# Patient Record
Sex: FEMALE | Race: WHITE | NOT HISPANIC OR LATINO | Employment: UNEMPLOYED | URBAN - METROPOLITAN AREA
[De-identification: names, ages, dates, MRNs, and addresses within clinical notes are randomized per-mention and may not be internally consistent; named-entity substitution may affect disease eponyms.]

---

## 2017-08-16 ENCOUNTER — OFFICE VISIT (OUTPATIENT)
Dept: URGENT CARE | Age: 22
End: 2017-08-16
Payer: COMMERCIAL

## 2017-08-16 PROCEDURE — S9088 SERVICES PROVIDED IN URGENT: HCPCS | Performed by: FAMILY MEDICINE

## 2017-08-16 PROCEDURE — 99213 OFFICE O/P EST LOW 20 MIN: CPT | Performed by: FAMILY MEDICINE

## 2018-02-10 ENCOUNTER — OFFICE VISIT (OUTPATIENT)
Dept: URGENT CARE | Age: 23
End: 2018-02-10
Payer: COMMERCIAL

## 2018-02-10 VITALS
DIASTOLIC BLOOD PRESSURE: 78 MMHG | HEIGHT: 64 IN | HEART RATE: 78 BPM | OXYGEN SATURATION: 98 % | BODY MASS INDEX: 30.22 KG/M2 | TEMPERATURE: 98 F | RESPIRATION RATE: 20 BRPM | WEIGHT: 177 LBS | SYSTOLIC BLOOD PRESSURE: 122 MMHG

## 2018-02-10 DIAGNOSIS — R68.89 FLU-LIKE SYMPTOMS: Primary | ICD-10-CM

## 2018-02-10 PROCEDURE — S9088 SERVICES PROVIDED IN URGENT: HCPCS | Performed by: FAMILY MEDICINE

## 2018-02-10 PROCEDURE — 99213 OFFICE O/P EST LOW 20 MIN: CPT | Performed by: FAMILY MEDICINE

## 2018-02-10 RX ORDER — BENZONATATE 100 MG/1
100 CAPSULE ORAL 3 TIMES DAILY PRN
Qty: 20 CAPSULE | Refills: 0 | Status: SHIPPED | OUTPATIENT
Start: 2018-02-10 | End: 2018-10-03

## 2018-02-10 NOTE — PATIENT INSTRUCTIONS
This appears to be viral illness, this may be influenza  Rest and drink extra fluids  Start cough medication as prescribed  OTC cold medications as needed  You can also try Mucinex, Flonase  Cool mist humidification as needed  Follow up with PCP if no improvement  Go to ER with worsening symptoms, difficulty breathing  Influenza   WHAT YOU NEED TO KNOW:   Influenza (the flu) is an infection caused by the influenza virus  The flu is easily spread when an infected person coughs, sneezes, or has close contact with others  You may be able to spread the flu to others for 1 week or longer after signs or symptoms appear  DISCHARGE INSTRUCTIONS:   Call 911 for any of the following:   · You have trouble breathing, and your lips look purple or blue  · You have a seizure  Return to the emergency department if:   · You are dizzy, or you are urinating less or not at all  · You have a headache with a stiff neck, and you feel tired or confused  · You have new pain or pressure in your chest     · Your symptoms, such as shortness of breath, vomiting, or diarrhea, get worse  · Your symptoms, such as fever and coughing, seem to get better, but then get worse  Contact your healthcare provider if:   · You have new muscle pain or weakness  · You have questions or concerns about your condition or care  Medicines: You may need any of the following:  · Acetaminophen  decreases pain and fever  It is available without a doctor's order  Ask how much to take and how often to take it  Follow directions  Acetaminophen can cause liver damage if not taken correctly  · NSAIDs , such as ibuprofen, help decrease swelling, pain, and fever  This medicine is available with or without a doctor's order  NSAIDs can cause stomach bleeding or kidney problems in certain people  If you take blood thinner medicine, always ask your healthcare provider if NSAIDs are safe for you   Always read the medicine label and follow directions  · Antivirals  help fight a viral infection  · Take your medicine as directed  Contact your healthcare provider if you think your medicine is not helping or if you have side effects  Tell him or her if you are allergic to any medicine  Keep a list of the medicines, vitamins, and herbs you take  Include the amounts, and when and why you take them  Bring the list or the pill bottles to follow-up visits  Carry your medicine list with you in case of an emergency  Rest  as much as you can to help you recover  Drink liquids as directed  to help prevent dehydration  Ask how much liquid to drink each day and which liquids are best for you  Prevent the spread of influenza:   · Wash your hands often  Use soap and water  Wash your hands after you use the bathroom, change a child's diapers, or sneeze  Wash your hands before you prepare or eat food  Use gel hand cleanser when soap and water are not available  Do not touch your eyes, nose, or mouth unless you have washed your hands first            · Cover your mouth when you sneeze or cough  Cough into a tissue or the bend of your arm  · Clean shared items with a germ-killing   Clean table surfaces, doorknobs, and light switches  Do not share towels, silverware, and dishes with people who are sick  Wash bed sheets, towels, silverware, and dishes with soap and water  · Wear a mask  over your mouth and nose if you are sick or are near anyone who is sick  · Stay away from others  if you are sick  · Influenza vaccine  helps prevent influenza (flu)  Everyone older than 6 months should get a yearly influenza vaccine  Get the vaccine as soon as it is available, usually in September or October each year  Follow up with your healthcare provider as directed:  Write down your questions so you remember to ask them during your visits     © 2017 [employer] Information is for End User's use only and may not be sold, redistributed or otherwise used for commercial purposes  All illustrations and images included in CareNotes® are the copyrighted property of A D [employer] M [employer]  or [employer]  The above information is an  only  It is not intended as medical advice for individual conditions or treatments  Talk to your doctor, nurse or pharmacist before following any medical regimen to see if it is safe and effective for you

## 2018-02-10 NOTE — PROGRESS NOTES
[facility]        NAME: Froy Isaac is a 23 y o  female  : 1995    MRN: [id number]  DATE: February 10, 2018  TIME: 12:58 PM    Assessment and Plan   Flu-like symptoms [R68 89]  1  Flu-like symptoms  benzonatate (TESSALON PERLES) 100 mg capsule         Patient Instructions     Patient Instructions     This appears to be viral illness, this may be influenza  Rest and drink extra fluids  Start cough medication as prescribed  OTC cold medications as needed  You can also try Mucinex, Flonase  Cool mist humidification as needed  Follow up with PCP if no improvement  Go to ER with worsening symptoms, difficulty breathing  Chief Complaint     Chief Complaint   Patient presents with    Headache     symptoms started on tuesday night  Denies any fever or chills  Had one loose stool with some nausea yesterday   Cough     productive cough- feels "scratchy"         History of Present Illness   Froy Isaac presents to the clinic c/o    This is a 23year old female here today with chest congestion, cough and headache  She states symptoms started about 4 days ago  She had bodyaches and headache at beginning of illness  She stats now cough, congestion and headache continue  No fever at this time  But she has had chills  She has been using sudafed and nyquil  Review of Systems   Review of Systems   Constitutional: Positive for fatigue  Negative for activity change and fever  HENT: Positive for congestion, sinus pain and sore throat  Negative for ear pain  Respiratory: Positive for cough  Negative for chest tightness and wheezing  Cardiovascular: Negative for chest pain  Gastrointestinal: Negative for diarrhea, nausea and vomiting  Musculoskeletal: Negative for arthralgias, joint swelling, neck pain and neck stiffness  Skin: Negative for rash  Neurological: Positive for headaches           Current Medications     Long-Term Prescriptions   Medication Sig Dispense Refill    Norethin-Eth Estrad-Fe Biphas (LO LOESTRIN FE) 1 MG-10 MCG / 10 MCG TABS Take by mouth         Current Allergies     Allergies as of 02/10/2018    (No Known Allergies)            The following portions of the patient's history were reviewed and updated as appropriate: allergies, current medications, past family history, past medical history, past social history, past surgical history and problem list     Objective   /78 (BP Location: Right arm, Patient Position: Sitting, Cuff Size: Standard)   Pulse 78   Temp 98 °F (36 7 °C)   Resp 20   Ht 5' 4" (1 626 m)   Wt 80 3 kg (177 lb)   LMP  (LMP Unknown)   SpO2 98%   BMI 30 38 kg/m²        Physical Exam     Physical Exam   Constitutional: She is oriented to person, place, and time  She appears well-developed and well-nourished  HENT:   Head: Normocephalic  Neck: Normal range of motion  Neck supple  Cardiovascular: Normal rate and regular rhythm  Pulmonary/Chest: Effort normal and breath sounds normal  No respiratory distress  She has no wheezes  She has no rales  She exhibits no tenderness  Neurological: She is alert and oriented to person, place, and time  Skin: Skin is warm and dry  Psychiatric: She has a normal mood and affect

## 2018-02-10 NOTE — LETTER
February 10, 2018     Patient: Brynn Bob   YOB: 1995   Date of Visit: 2/10/2018       To Whom It May Concern: It is my medical opinion that Britni Krishnamurthy may return to work on 02/12/2018  If you have any questions or concerns, please don't hesitate to call           Sincerely,        JULIAN Odell    CC: No Recipients

## 2018-10-03 ENCOUNTER — OFFICE VISIT (OUTPATIENT)
Dept: FAMILY MEDICINE CLINIC | Facility: CLINIC | Age: 23
End: 2018-10-03
Payer: COMMERCIAL

## 2018-10-03 VITALS
OXYGEN SATURATION: 98 % | SYSTOLIC BLOOD PRESSURE: 120 MMHG | DIASTOLIC BLOOD PRESSURE: 72 MMHG | HEIGHT: 64 IN | TEMPERATURE: 98.6 F | HEART RATE: 58 BPM | BODY MASS INDEX: 30.87 KG/M2 | WEIGHT: 180.8 LBS

## 2018-10-03 DIAGNOSIS — E66.9 OBESITY (BMI 30-39.9): ICD-10-CM

## 2018-10-03 DIAGNOSIS — G56.01 CARPAL TUNNEL SYNDROME OF RIGHT WRIST: Primary | ICD-10-CM

## 2018-10-03 PROCEDURE — 99214 OFFICE O/P EST MOD 30 MIN: CPT | Performed by: INTERNAL MEDICINE

## 2018-10-03 RX ORDER — GABAPENTIN 100 MG/1
100 CAPSULE ORAL
Qty: 30 CAPSULE | Refills: 0 | Status: SHIPPED | OUTPATIENT
Start: 2018-10-03 | End: 2018-11-05 | Stop reason: SDUPTHER

## 2018-10-03 RX ORDER — VITAMIN B COMPLEX
1 CAPSULE ORAL DAILY
Qty: 90 CAPSULE | Refills: 1 | Status: SHIPPED | OUTPATIENT
Start: 2018-10-03 | End: 2019-01-09 | Stop reason: SDUPTHER

## 2018-10-03 NOTE — PROGRESS NOTES
Assessment/Plan:         Diagnoses and all orders for this visit:    Carpal tunnel syndrome of right wrist : most likely  Bilt  But worse on Right side :  -     EMG 2 Limb Upper Extremity; Future  -     Basic metabolic panel; Future  -     CBC; Future  -     Lipid panel; Future  -     TSH, 3rd generation; Future  -     T4, free; Future  -     Thyroid Antibodies Panel; Future  -     Hepatic function panel; Future  -     Magnesium; Future  -     Vitamin B12; Future  -     Sedimentation rate, automated; Future  -     Vitamin D 25 hydroxy; Future  -     Urinalysis with reflex to microscopic  Wrist/Cucup splints On nighttime off day time  Try :  -     b complex vitamins capsule; Take 1 capsule by mouth daily  -     gabapentin (NEURONTIN) 100 mg capsule; Take 1 capsule (100 mg total) by mouth daily at bedtime With food  RTC in 1mo    Obesity (BMI 30-39 9)  -     Life Style mod  RTC in 3mos  -     Basic metabolic panel; Future  -     CBC; Future  -     Lipid panel; Future  -     TSH, 3rd generation; Future  -     T4, free; Future  -     Thyroid Antibodies Panel; Future  -     Hepatic function panel; Future  -     Magnesium; Future  -     Vitamin B12; Future  -     Sedimentation rate, automated; Future  -     Vitamin D 25 hydroxy; Future  -     Urinalysis with reflex to microscopic  -     b complex vitamins capsule; Take 1 capsule by mouth daily        Subjective:      Patient ID: Frankie Simms is a 20 y o  female  20 Y O lady is here for regular check up and increasing Neuropathy both hands rt is worse,  For few mos    No recent blood work,  Med List Reviewed w pt in Detail    No trauma,  Hand Pain    Associated symptoms include numbness  Pertinent negatives include no chest pain         The following portions of the patient's history were reviewed and updated as appropriate: allergies, current medications, past family history, past medical history, past social history, past surgical history and problem list     Review of Systems   Constitutional: Negative for chills, fatigue and fever  HENT: Negative for congestion, facial swelling, sore throat, trouble swallowing and voice change  Eyes: Negative for pain, discharge and visual disturbance  Respiratory: Negative for cough, shortness of breath and wheezing  Cardiovascular: Negative for chest pain, palpitations and leg swelling  Gastrointestinal: Negative for abdominal pain, blood in stool, constipation, diarrhea and nausea  Endocrine: Negative for polydipsia, polyphagia and polyuria  Genitourinary: Negative for difficulty urinating, hematuria and urgency  Musculoskeletal: Negative for arthralgias and myalgias  Skin: Negative for rash  Neurological: Positive for numbness  Negative for dizziness, tremors, weakness and headaches  Hematological: Negative for adenopathy  Does not bruise/bleed easily  Psychiatric/Behavioral: Negative for dysphoric mood, sleep disturbance and suicidal ideas  Objective:      /72 (BP Location: Left arm, Patient Position: Sitting, Cuff Size: Standard)   Pulse 58   Temp 98 6 °F (37 °C) (Oral)   Ht 5' 4" (1 626 m)   Wt 82 kg (180 lb 12 8 oz)   SpO2 98%   Breastfeeding? No   BMI 31 03 kg/m²          Physical Exam   Constitutional: She is oriented to person, place, and time  She appears well-nourished  No distress  HENT:   Head: Normocephalic  Mouth/Throat: Oropharynx is clear and moist  No oropharyngeal exudate  Eyes: Pupils are equal, round, and reactive to light  Conjunctivae are normal  No scleral icterus  Neck: Neck supple  No thyromegaly present  Cardiovascular: Normal rate, regular rhythm and normal heart sounds  No murmur heard  Pulmonary/Chest: Effort normal and breath sounds normal  No respiratory distress  She has no wheezes  She has no rales  Abdominal: Soft  Bowel sounds are normal  She exhibits no distension  There is no tenderness  There is no rebound and no guarding  Musculoskeletal: She exhibits no edema or tenderness  Lymphadenopathy:     She has no cervical adenopathy  Neurological: She is alert and oriented to person, place, and time  No cranial nerve deficit  Coordination normal    Neuropathy over the Median nerve both wrists more on Right side,    Skin: No rash noted  No erythema  No pallor  Psychiatric: She has a normal mood and affect

## 2018-10-18 ENCOUNTER — TRANSCRIBE ORDERS (OUTPATIENT)
Dept: LAB | Age: 23
End: 2018-10-18

## 2018-10-18 DIAGNOSIS — R94.5 NONSPECIFIC ABNORMAL RESULTS OF LIVER FUNCTION STUDY: ICD-10-CM

## 2018-10-18 DIAGNOSIS — E78.5 HYPERLIPIDEMIA, UNSPECIFIED HYPERLIPIDEMIA TYPE: Primary | ICD-10-CM

## 2018-10-25 ENCOUNTER — APPOINTMENT (OUTPATIENT)
Dept: LAB | Facility: CLINIC | Age: 23
End: 2018-10-25
Payer: COMMERCIAL

## 2018-10-25 DIAGNOSIS — E78.5 HYPERLIPIDEMIA, UNSPECIFIED HYPERLIPIDEMIA TYPE: ICD-10-CM

## 2018-10-25 DIAGNOSIS — R94.5 NONSPECIFIC ABNORMAL RESULTS OF LIVER FUNCTION STUDY: ICD-10-CM

## 2018-10-25 LAB
ALBUMIN SERPL BCP-MCNC: 3.7 G/DL (ref 3.5–5)
ALP SERPL-CCNC: 79 U/L (ref 46–116)
ALT SERPL W P-5'-P-CCNC: 23 U/L (ref 12–78)
ANION GAP SERPL CALCULATED.3IONS-SCNC: 5 MMOL/L (ref 4–13)
AST SERPL W P-5'-P-CCNC: 18 U/L (ref 5–45)
BILIRUB DIRECT SERPL-MCNC: 0.08 MG/DL (ref 0–0.2)
BILIRUB SERPL-MCNC: 0.32 MG/DL (ref 0.2–1)
BILIRUB UR QL STRIP: NEGATIVE
BUN SERPL-MCNC: 8 MG/DL (ref 5–25)
CALCIUM SERPL-MCNC: 9.1 MG/DL (ref 8.3–10.1)
CHLORIDE SERPL-SCNC: 105 MMOL/L (ref 100–108)
CHOLEST SERPL-MCNC: 197 MG/DL (ref 50–200)
CLARITY UR: NORMAL
CO2 SERPL-SCNC: 28 MMOL/L (ref 21–32)
COLOR UR: YELLOW
CREAT SERPL-MCNC: 0.76 MG/DL (ref 0.6–1.3)
GFR SERPL CREATININE-BSD FRML MDRD: 109 ML/MIN/1.73SQ M
GLUCOSE P FAST SERPL-MCNC: 71 MG/DL (ref 65–99)
GLUCOSE UR STRIP-MCNC: NEGATIVE MG/DL
HDLC SERPL-MCNC: 43 MG/DL (ref 40–60)
HGB UR QL STRIP.AUTO: NEGATIVE
KETONES UR STRIP-MCNC: NEGATIVE MG/DL
LDLC SERPL CALC-MCNC: 105 MG/DL (ref 0–100)
LEUKOCYTE ESTERASE UR QL STRIP: NEGATIVE
NITRITE UR QL STRIP: NEGATIVE
PH UR STRIP.AUTO: 7.5 [PH] (ref 4.5–8)
POTASSIUM SERPL-SCNC: 3.7 MMOL/L (ref 3.5–5.3)
PROT SERPL-MCNC: 7.7 G/DL (ref 6.4–8.2)
PROT UR STRIP-MCNC: NEGATIVE MG/DL
SODIUM SERPL-SCNC: 138 MMOL/L (ref 136–145)
SP GR UR STRIP.AUTO: 1.02 (ref 1–1.03)
TRIGL SERPL-MCNC: 243 MG/DL
UROBILINOGEN UR QL STRIP.AUTO: 0.2 E.U./DL

## 2018-10-25 PROCEDURE — 80048 BASIC METABOLIC PNL TOTAL CA: CPT

## 2018-10-25 PROCEDURE — 80061 LIPID PANEL: CPT

## 2018-10-25 PROCEDURE — 80076 HEPATIC FUNCTION PANEL: CPT

## 2018-10-25 PROCEDURE — 81003 URINALYSIS AUTO W/O SCOPE: CPT | Performed by: INTERNAL MEDICINE

## 2018-11-05 DIAGNOSIS — G56.01 CARPAL TUNNEL SYNDROME OF RIGHT WRIST: ICD-10-CM

## 2018-11-05 RX ORDER — GABAPENTIN 100 MG/1
100 CAPSULE ORAL
Qty: 30 CAPSULE | Refills: 0 | Status: SHIPPED | OUTPATIENT
Start: 2018-11-05 | End: 2019-01-09 | Stop reason: SDUPTHER

## 2018-11-07 ENCOUNTER — OFFICE VISIT (OUTPATIENT)
Dept: FAMILY MEDICINE CLINIC | Facility: CLINIC | Age: 23
End: 2018-11-07
Payer: COMMERCIAL

## 2018-11-07 VITALS
HEIGHT: 64 IN | WEIGHT: 186.8 LBS | BODY MASS INDEX: 31.89 KG/M2 | RESPIRATION RATE: 14 BRPM | OXYGEN SATURATION: 99 % | HEART RATE: 72 BPM | SYSTOLIC BLOOD PRESSURE: 120 MMHG | DIASTOLIC BLOOD PRESSURE: 74 MMHG | TEMPERATURE: 98.3 F

## 2018-11-07 DIAGNOSIS — E66.9 OBESITY (BMI 30-39.9): ICD-10-CM

## 2018-11-07 DIAGNOSIS — R79.89 LOW VITAMIN D LEVEL: Primary | ICD-10-CM

## 2018-11-07 DIAGNOSIS — G56.03 BILATERAL CARPAL TUNNEL SYNDROME: ICD-10-CM

## 2018-11-07 PROCEDURE — 1036F TOBACCO NON-USER: CPT | Performed by: INTERNAL MEDICINE

## 2018-11-07 PROCEDURE — 3008F BODY MASS INDEX DOCD: CPT | Performed by: INTERNAL MEDICINE

## 2018-11-07 PROCEDURE — 99214 OFFICE O/P EST MOD 30 MIN: CPT | Performed by: INTERNAL MEDICINE

## 2018-11-07 RX ORDER — ERGOCALCIFEROL 1.25 MG/1
50000 CAPSULE ORAL WEEKLY
Qty: 4 CAPSULE | Refills: 3 | Status: SHIPPED | OUTPATIENT
Start: 2018-11-07 | End: 2019-01-09 | Stop reason: SDUPTHER

## 2018-11-07 RX ORDER — METOPROLOL SUCCINATE 25 MG/1
TABLET, EXTENDED RELEASE ORAL
COMMUNITY
Start: 2017-08-30 | End: 2018-11-07 | Stop reason: SURG

## 2018-11-07 NOTE — PROGRESS NOTES
Assessment/Plan:         Diagnoses and all orders for this visit:    Low vitamin D level : Start :  -     ergocalciferol (VITAMIN D2) 50,000 units; Take 1 capsule (50,000 Units total) by mouth once a week    Bilateral carpal tunnel syndrome: go for EMG  Use B Complex and gabapentin and Splints  RTC in 2-3 mos    Obesity (BMI 30-39 9); life style mod  Lose weight    Subjective:      Patient ID: Adam Chen is a 20 y o  female  20 Y O lady with H/O C T syndrome,Obesity,  Is here for Regular check up, No New Symptoms, recent blood work and med list reviewed w pt in detail    The following portions of the patient's history were reviewed and updated as appropriate: allergies, current medications, past family history, past medical history, past social history, past surgical history and problem list     Review of Systems   Constitutional: Negative for chills, fatigue and fever  HENT: Negative for congestion, facial swelling, sore throat, trouble swallowing and voice change  Eyes: Negative for pain, discharge and visual disturbance  Respiratory: Negative for cough, shortness of breath and wheezing  Cardiovascular: Negative for chest pain, palpitations and leg swelling  Gastrointestinal: Negative for abdominal pain, blood in stool, constipation, diarrhea and nausea  Endocrine: Negative for polydipsia, polyphagia and polyuria  Genitourinary: Negative for difficulty urinating, hematuria and urgency  Musculoskeletal: Negative for arthralgias and myalgias  Skin: Negative for rash  Neurological: Positive for numbness  Negative for dizziness, tremors, weakness and headaches  Hematological: Negative for adenopathy  Does not bruise/bleed easily  Psychiatric/Behavioral: Negative for dysphoric mood, sleep disturbance and suicidal ideas           Objective:      /74 (BP Location: Left arm, Patient Position: Sitting, Cuff Size: Standard)   Pulse 72   Temp 98 3 °F (36 8 °C) (Oral)   Resp 14   Ht 5' 4" (1 626 m)   Wt 84 7 kg (186 lb 12 8 oz)   SpO2 99%   BMI 32 06 kg/m²          Physical Exam   Constitutional: She is oriented to person, place, and time  She appears well-nourished  No distress  HENT:   Head: Normocephalic  Mouth/Throat: Oropharynx is clear and moist  No oropharyngeal exudate  Eyes: Pupils are equal, round, and reactive to light  Conjunctivae are normal  No scleral icterus  Neck: Neck supple  No thyromegaly present  Cardiovascular: Normal rate, regular rhythm and normal heart sounds  No murmur heard  Pulmonary/Chest: Effort normal and breath sounds normal  No respiratory distress  She has no wheezes  She has no rales  Abdominal: Soft  Bowel sounds are normal  She exhibits no distension  There is no tenderness  There is no rebound and no guarding  Obese   Musculoskeletal: She exhibits no edema or tenderness  Lymphadenopathy:     She has no cervical adenopathy  Neurological: She is alert and oriented to person, place, and time  No cranial nerve deficit  Coordination normal    Bilt C  T  Syndrome  Worse on Right side  ,  Skin: No rash noted  No erythema  No pallor  Psychiatric: She has a normal mood and affect

## 2018-11-28 ENCOUNTER — HOSPITAL ENCOUNTER (OUTPATIENT)
Dept: NEUROLOGY | Facility: CLINIC | Age: 23
Discharge: HOME | End: 2018-11-28
Payer: COMMERCIAL

## 2018-11-28 DIAGNOSIS — G56.01 CARPAL TUNNEL SYNDROME OF RIGHT WRIST: ICD-10-CM

## 2018-11-28 DIAGNOSIS — E66.9 OBESITY (BMI 30-39.9): ICD-10-CM

## 2018-11-28 PROCEDURE — 95913 NRV CNDJ TEST 13/> STUDIES: CPT | Performed by: PHYSICAL MEDICINE & REHABILITATION

## 2018-11-28 PROCEDURE — 95886 MUSC TEST DONE W/N TEST COMP: CPT | Performed by: PHYSICAL MEDICINE & REHABILITATION

## 2018-11-28 NOTE — PROCEDURES
[address] [facility] Jeannette Lozano [facility] , [address]  [phone]        Name: Keesha Thornton  Patient ID: [id number]   Age: 20 Years 10 Months  YOB: 1995   Account Number:    Gender: Female   Technologist:    Date of Exam: 11/28/2018 08:10   Referring Physician: Rosalia Bryant MD  Temperature: 33 1   Examining Physician: Rae Wetzel MD  Height:                 Patient History:   20 y o female with bilateral hand R>L numbness/tingling in digits 2-4 for several years  Denies neck pain radiating to distal extremities  Referred for carpal tunnel evaluation  Exam: strength 5/5 bilateral UE throughout  +phalen bilaterally  negative tinel bilateral            MNC      Nerve / Sites Muscle Latency Ref  Amplitude Ref  Duration Rel Amp Distance Lat Diff Velocity Ref  ms ms mV mV ms % mm ms m/s m/s   R Median - APB      Wrist APB 7 03 ?4 20 4 8 ?4 0 7 76 100 70         Elbow APB 11 46  4 4  7 66 92 200 4 43 45 ?50   L Median - APB      Wrist APB 4 79 ?4 20 7 0 ?4 0 6 88 100 70         Elbow APB 8 70  6 8  7 14 97 7 210 3 91 54 ?50   R Ulnar - ADM      Wrist ADM 2 45 ?3 30 12 3 ?5 0 5 94 100 70         B  Elbow ADM 5 57  12 2  6 30 98 8 195 3 13 62 ?50      A  Elbow ADM 7 34  11 6  6 25 95 6 95 1 77 54 ?49            4 90     L Ulnar - ADM      Wrist ADM 2 45 ?3 30 13 3 ?5 0 5 36 100 70         B  Elbow ADM 5 68  12 8  5 52 96 1 200 3 23 62 ?50      A  Elbow ADM 7 40  13 1  5 47 102 100 1 72 58 ?49            4 95         SNC      Nerve / Sites Rec  Site Onset Lat Peak Lat Ref  Amp Ref  Distance Peak Diff Ref  Velocity Ref       ms ms ms µV µV mm ms ms m/s m/s   R Median - Digit II (Antidromic)      Wrist Dig II NR NR ?3 50 NR ?10 0 130   NR ?50   L Median - Digit II (Antidromic)      Wrist Dig II 3 75 4 64 ?3 50 18 2 ?10 0 130   35 ?50   R Ulnar - Digit V (Antidromic)      Wrist Dig V 2 14 2 86 ?3 10 32 7 ?10 0 110   52 ?50   L Ulnar - Digit V (Antidromic)      Wrist Dig V 2 14 2 81 ?3 10 30 7 ?10 0 110   52 ?50   R Radial - Anatomical snuff box (Forearm)      Forearm Wrist 1 88 2 45 ?2 90 14 0 ?10 0 100   53 ?50   L Radial - Anatomical snuff box (Forearm)      Forearm Wrist 1 67 2 55 ?2 90 36 4 ?10 0 100   60 ?50   R Median, Ulnar - Transcarpal comparison      Median Palm Wrist 2 03 2 92 ?2 20 33 6 ?50 0 80   39       Ulnar Palm Wrist 1 35 1 72 ?2 20 31 7 ?12 0 80   59            1 20 ?0 40     L Median, Ulnar - Transcarpal comparison      Median Palm Wrist 2 50 3 13 ?2 20 22 7 ?50 0 80   32       Ulnar Palm Wrist 1 25 1 77 ?2 20 37 2 ?12 0 80   64            1 35 ?0 40     L Median, Radial - Thumb comparison      Median Wrist Thumb 3 18 4 06  10 5  100   31       Radial Wrist Thumb 2 34 2 92  14 5  100   43            1 15 ?0 50         EMG         Needle EMG Examination     Insertional Spontaneous MUAP   Muscle Nerve Roots Activity Fib PSW Fasc Dur  Amp Poly Config Recruitment   R  Deltoid Axillary C5-C6 Normal None None None Normal Normal None Normal Normal   R  Biceps brachii Musculocutaneous C5-C6 Normal None None None Normal Normal None Normal Normal   R  Triceps brachii Radial C6-C8 Normal None None None Normal Normal None Normal Normal   R  Pronator teres Median C6-C7 Normal None None None Normal Normal None Normal Normal   R  First dorsal interosseous Ulnar C8-T1 Normal None None None Normal Normal None Normal Normal   R  Abductor pollicis brevis Median H3-O4 Normal None None None Normal Normal None Normal Normal   L  Deltoid Axillary C5-C6 Normal None None None Normal Normal None Normal Normal   L  Biceps brachii Musculocutaneous C5-C6 Normal None None None Normal Normal None Normal Normal   L  Triceps brachii Radial C6-C8 Normal None None None Normal Normal None Normal Normal   L  Pronator teres Median C6-C7 Normal None None None Normal Normal None Normal Normal   L  First dorsal interosseous Ulnar C8-T1 Normal None None None Normal Normal None Normal Normal   L   Abductor pollicis brevis Median C8-T1 Normal None None None Normal Normal None Normal Normal         Findings:   Motor:  Left median compound motor action potential (CMAP) demonstrated prolonged distal latency, normal amplitude, and normal conduction velocity across the forearm  Left ulnar compound motor action potential (CMAP) demonstrated normal distal latency, normal amplitude, and normal conduction velocity across the elbow and forearm  Right median compound motor action potential (CMAP) demonstrated prolonged distal latency, normal amplitude, and normal conduction velocity across the forearm    Right ulnar compound motor action potential (CMAP) demonstrated normal distal latency, normal amplitude, and normal conduction velocity across the elbow and forearm  Sensory:  Left median sensory nerve action potential (SNAP) demonstrated normal amplitude and prolonged peak latency  Left ulnar sensory nerve action potential (SNAP) demonstrated normal amplitude and normal peak latency  Left radial sensory nerve action potential (SNAP) demonstrated normal amplitude and normal peak latency  Left median and ulnar sensory nerve action potential (SNAP) comparison study to the wrist (transcarpal) demonstrated prolonged latency difference  Left median and radial sensory nerve action potential (SNAP) comparison study to digit 1 demonstrated prolonged latency difference  Right median sensory nerve action potential (SNAP) to digit II unobtainable  Right ulnar sensory nerve action potential (SNAP) demonstrated normal amplitude and normal peak latency  Right radial sensory nerve action potential (SNAP) demonstrated normal amplitude and normal peak latency  Right median and ulnar sensory nerve action potential (SNAP) comparison study to the wrist (transcarpal) demonstrated prolonged latency difference         EMG:  A disposable monopolar needle was used to study selected muscles in the left and right upper extremity including the deltoid, biceps, triceps, pronator teres, first dorsal interosseous, and abductor pollicis brevis  All muscles tested demonstrated normal insertional activity, no abnormal spontaneous activity, and normal volitional motor unit action potentials  Impression: Abnormal study  1  There is electrodiagnostic evidence of a right worse than left median sensorimotor demyelinating mononeuropathy across the wrist, consistent with moderate bilateral carpal tunnel syndrome  There is no evidence of denervation to the right or left abductor pollicis brevis  2  There is no electrodiagnostic evidence of a right or left ulnar or radial mononeuropathy, cervical radiculopathy, or brachial plexopathy               ___________________________  Doug Michelle MD

## 2019-01-09 ENCOUNTER — OFFICE VISIT (OUTPATIENT)
Dept: FAMILY MEDICINE CLINIC | Facility: CLINIC | Age: 24
End: 2019-01-09
Payer: COMMERCIAL

## 2019-01-09 VITALS
RESPIRATION RATE: 14 BRPM | BODY MASS INDEX: 31.58 KG/M2 | SYSTOLIC BLOOD PRESSURE: 100 MMHG | HEART RATE: 68 BPM | HEIGHT: 64 IN | TEMPERATURE: 97.5 F | DIASTOLIC BLOOD PRESSURE: 66 MMHG | OXYGEN SATURATION: 97 % | WEIGHT: 185 LBS

## 2019-01-09 DIAGNOSIS — G56.03 CARPAL TUNNEL SYNDROME, BILATERAL UPPER LIMBS: ICD-10-CM

## 2019-01-09 DIAGNOSIS — Z23 NEED FOR TDAP VACCINATION: ICD-10-CM

## 2019-01-09 DIAGNOSIS — E66.9 OBESITY (BMI 30-39.9): Primary | ICD-10-CM

## 2019-01-09 DIAGNOSIS — G56.01 CARPAL TUNNEL SYNDROME OF RIGHT WRIST: ICD-10-CM

## 2019-01-09 DIAGNOSIS — R79.89 LOW VITAMIN D LEVEL: ICD-10-CM

## 2019-01-09 DIAGNOSIS — E01.0 THYROMEGALY: ICD-10-CM

## 2019-01-09 PROCEDURE — 99214 OFFICE O/P EST MOD 30 MIN: CPT | Performed by: INTERNAL MEDICINE

## 2019-01-09 PROCEDURE — 90715 TDAP VACCINE 7 YRS/> IM: CPT

## 2019-01-09 PROCEDURE — 3008F BODY MASS INDEX DOCD: CPT | Performed by: INTERNAL MEDICINE

## 2019-01-09 PROCEDURE — 1036F TOBACCO NON-USER: CPT | Performed by: INTERNAL MEDICINE

## 2019-01-09 PROCEDURE — 90471 IMMUNIZATION ADMIN: CPT

## 2019-01-09 RX ORDER — ERGOCALCIFEROL 1.25 MG/1
50000 CAPSULE ORAL WEEKLY
Qty: 4 CAPSULE | Refills: 3 | Status: SHIPPED | OUTPATIENT
Start: 2019-01-09 | End: 2021-12-27 | Stop reason: ALTCHOICE

## 2019-01-09 RX ORDER — VITAMIN B COMPLEX
1 CAPSULE ORAL DAILY
Qty: 90 CAPSULE | Refills: 3 | Status: SHIPPED | OUTPATIENT
Start: 2019-01-09 | End: 2021-12-27 | Stop reason: SDUPTHER

## 2019-01-09 RX ORDER — GABAPENTIN 100 MG/1
100 CAPSULE ORAL
Qty: 30 CAPSULE | Refills: 3 | Status: SHIPPED | OUTPATIENT
Start: 2019-01-09 | End: 2021-12-27 | Stop reason: ALTCHOICE

## 2019-01-09 NOTE — PROGRESS NOTES
Assessment/Plan:         Diagnoses and all orders for this visit:    Obesity (BMI 30-39  9): life Style Mod  -     b complex vitamins capsule; Take 1 capsule by mouth daily  -     Chronic Hepatitis Panel; Future    Need for Tdap vaccination  -     TDAP VACCINE GREATER THAN OR EQUAL TO 6YO IM    Low vitamin D level:  -     ergocalciferol (VITAMIN D2) 50,000 units; Take 1 capsule (50,000 Units total) by mouth once a week    Carpal tunnel syndrome, bilateral upper limbs: use wrist splints On daytime      RTC in 1mo w ;  -     Basic metabolic panel; Future  -     Hemoglobin A1C; Future  -     Sedimentation rate, automated; Future  -     TSH, 3rd generation; Future  -     T4, free; Future  -     Thyroid Antibodies Panel; Future  -     Vitamin B1 (Thiamine), Serum/Plasma, LC/MS/MS; Future  -     Vitamin B12; Future  -     Vitamin B6; Future  -     Folate; Future  -     Chronic Hepatitis Panel; Future   Continue :  -     gabapentin (NEURONTIN) 100 mg capsule; Take 1 capsule (100 mg total) by mouth daily at bedtime With food  -     b complex vitamins capsule; Take 1 capsule by mouth d    Thyromegaly  -     US thyroid; Future        Subjective:      Patient ID: Erwin Garcia is a 20 y o  female  20 Y O lady with C  T  Syndrome ,EMG was Positive,  Is here for Regular check up         Hand Pain    Associated symptoms include numbness  Pertinent negatives include no chest pain  The following portions of the patient's history were reviewed and updated as appropriate: allergies, current medications, past family history, past medical history, past social history, past surgical history and problem list     Review of Systems   Constitutional: Negative for chills, fatigue and fever  HENT: Negative for congestion, facial swelling, sore throat, trouble swallowing and voice change  Eyes: Negative for pain, discharge and visual disturbance  Respiratory: Negative for cough, shortness of breath and wheezing  Cardiovascular: Negative for chest pain, palpitations and leg swelling  Gastrointestinal: Negative for abdominal pain, blood in stool, constipation, diarrhea and nausea  Endocrine: Negative for polydipsia, polyphagia and polyuria  Genitourinary: Negative for difficulty urinating, hematuria and urgency  Musculoskeletal: Negative for arthralgias and myalgias  Skin: Negative for rash  Neurological: Positive for numbness  Negative for dizziness, tremors, weakness and headaches  Hematological: Negative for adenopathy  Does not bruise/bleed easily  Psychiatric/Behavioral: Negative for dysphoric mood, sleep disturbance and suicidal ideas  Objective:      /66 (BP Location: Left arm, Patient Position: Sitting, Cuff Size: Standard)   Pulse 68   Temp 97 5 °F (36 4 °C) (Oral)   Resp 14   Ht 5' 4" (1 626 m)   Wt 83 9 kg (185 lb)   SpO2 97%   BMI 31 76 kg/m²          Physical Exam   Constitutional: She is oriented to person, place, and time  She appears well-nourished  No distress  HENT:   Head: Normocephalic  Mouth/Throat: Oropharynx is clear and moist  No oropharyngeal exudate  Eyes: Pupils are equal, round, and reactive to light  Conjunctivae are normal  No scleral icterus  Neck: Neck supple  No thyromegaly present  Cardiovascular: Normal rate, regular rhythm and normal heart sounds  No murmur heard  Pulmonary/Chest: Effort normal and breath sounds normal  No respiratory distress  She has no wheezes  She has no rales  Abdominal: Soft  Bowel sounds are normal  She exhibits no distension  There is no tenderness  There is no rebound and no guarding  Obese   Musculoskeletal: She exhibits tenderness  She exhibits no edema  Lymphadenopathy:     She has no cervical adenopathy  Neurological: She is alert and oriented to person, place, and time  Bilt Upper Ext C  T  Syndrome ,worse on right side      Skin: No rash noted  No erythema  No pallor     Psychiatric: She has a normal mood and affect

## 2019-09-18 ENCOUNTER — OCCMED (OUTPATIENT)
Dept: URGENT CARE | Facility: CLINIC | Age: 24
End: 2019-09-18

## 2019-09-18 DIAGNOSIS — Z02.1 PHYSICAL EXAM, PRE-EMPLOYMENT: Primary | ICD-10-CM

## 2019-09-18 LAB — RUBV IGG SERPL IA-ACNC: 11.7 IU/ML

## 2019-09-18 PROCEDURE — 86735 MUMPS ANTIBODY: CPT | Performed by: PHYSICIAN ASSISTANT

## 2019-09-18 PROCEDURE — 86762 RUBELLA ANTIBODY: CPT | Performed by: PHYSICIAN ASSISTANT

## 2019-09-18 PROCEDURE — 86787 VARICELLA-ZOSTER ANTIBODY: CPT | Performed by: PHYSICIAN ASSISTANT

## 2019-09-18 PROCEDURE — 86765 RUBEOLA ANTIBODY: CPT | Performed by: PHYSICIAN ASSISTANT

## 2019-09-18 PROCEDURE — 86480 TB TEST CELL IMMUN MEASURE: CPT | Performed by: PHYSICIAN ASSISTANT

## 2019-09-19 LAB
MEV IGG SER QL: NORMAL
MUV IGG SER QL: NORMAL
VZV IGG SER IA-ACNC: NORMAL

## 2019-09-20 LAB
GAMMA INTERFERON BACKGROUND BLD IA-ACNC: 0.08 IU/ML
M TB IFN-G BLD-IMP: NEGATIVE
M TB IFN-G CD4+ BCKGRND COR BLD-ACNC: 0.02 IU/ML
M TB IFN-G CD4+ BCKGRND COR BLD-ACNC: 0.09 IU/ML
MITOGEN IGNF BCKGRD COR BLD-ACNC: >10 IU/ML

## 2019-11-13 ENCOUNTER — LAB REQUISITION (OUTPATIENT)
Dept: LAB | Facility: HOSPITAL | Age: 24
End: 2019-11-13
Payer: COMMERCIAL

## 2019-11-13 DIAGNOSIS — Z01.419 ENCOUNTER FOR GYNECOLOGICAL EXAMINATION (GENERAL) (ROUTINE) WITHOUT ABNORMAL FINDINGS: ICD-10-CM

## 2019-11-13 PROCEDURE — G0145 SCR C/V CYTO,THINLAYER,RESCR: HCPCS | Performed by: OBSTETRICS & GYNECOLOGY

## 2019-11-19 LAB
LAB AP GYN PRIMARY INTERPRETATION: NORMAL
LAB AP LMP: NORMAL
Lab: NORMAL

## 2021-12-27 ENCOUNTER — OFFICE VISIT (OUTPATIENT)
Dept: FAMILY MEDICINE CLINIC | Facility: CLINIC | Age: 26
End: 2021-12-27
Payer: COMMERCIAL

## 2021-12-27 VITALS
WEIGHT: 195.4 LBS | DIASTOLIC BLOOD PRESSURE: 70 MMHG | HEIGHT: 65 IN | SYSTOLIC BLOOD PRESSURE: 110 MMHG | HEART RATE: 90 BPM | TEMPERATURE: 97.8 F | BODY MASS INDEX: 32.55 KG/M2 | OXYGEN SATURATION: 97 % | RESPIRATION RATE: 16 BRPM

## 2021-12-27 DIAGNOSIS — Z13.220 LIPID SCREENING: ICD-10-CM

## 2021-12-27 DIAGNOSIS — Z76.89 ENCOUNTER TO ESTABLISH CARE: ICD-10-CM

## 2021-12-27 DIAGNOSIS — R10.13 EPIGASTRIC PAIN: ICD-10-CM

## 2021-12-27 DIAGNOSIS — Z01.818 PRE-OP EXAM: Primary | ICD-10-CM

## 2021-12-27 PROCEDURE — 99214 OFFICE O/P EST MOD 30 MIN: CPT | Performed by: NURSE PRACTITIONER

## 2021-12-27 PROCEDURE — 93000 ELECTROCARDIOGRAM COMPLETE: CPT | Performed by: NURSE PRACTITIONER

## 2021-12-27 RX ORDER — NORGESTIMATE AND ETHINYL ESTRADIOL 0.25-0.035
1 KIT ORAL DAILY
COMMUNITY

## 2021-12-28 ENCOUNTER — APPOINTMENT (OUTPATIENT)
Dept: LAB | Facility: CLINIC | Age: 26
End: 2021-12-28
Payer: COMMERCIAL

## 2021-12-28 DIAGNOSIS — Z01.818 PRE-OP EXAM: ICD-10-CM

## 2021-12-28 DIAGNOSIS — Z13.220 LIPID SCREENING: ICD-10-CM

## 2021-12-28 DIAGNOSIS — R10.13 EPIGASTRIC PAIN: ICD-10-CM

## 2021-12-28 LAB
ALBUMIN SERPL BCP-MCNC: 3.6 G/DL (ref 3.5–5)
ALP SERPL-CCNC: 68 U/L (ref 46–116)
ALT SERPL W P-5'-P-CCNC: 30 U/L (ref 12–78)
ANION GAP SERPL CALCULATED.3IONS-SCNC: 4 MMOL/L (ref 4–13)
APTT PPP: 30 SECONDS (ref 23–37)
AST SERPL W P-5'-P-CCNC: 20 U/L (ref 5–45)
BASOPHILS # BLD AUTO: 0.02 THOUSANDS/ΜL (ref 0–0.1)
BASOPHILS NFR BLD AUTO: 0 % (ref 0–1)
BILIRUB SERPL-MCNC: 0.25 MG/DL (ref 0.2–1)
BUN SERPL-MCNC: 9 MG/DL (ref 5–25)
CALCIUM SERPL-MCNC: 9.6 MG/DL (ref 8.3–10.1)
CHLORIDE SERPL-SCNC: 105 MMOL/L (ref 100–108)
CHOLEST SERPL-MCNC: 205 MG/DL
CO2 SERPL-SCNC: 28 MMOL/L (ref 21–32)
CREAT SERPL-MCNC: 0.79 MG/DL (ref 0.6–1.3)
EOSINOPHIL # BLD AUTO: 0.12 THOUSAND/ΜL (ref 0–0.61)
EOSINOPHIL NFR BLD AUTO: 2 % (ref 0–6)
ERYTHROCYTE [DISTWIDTH] IN BLOOD BY AUTOMATED COUNT: 12.7 % (ref 11.6–15.1)
GFR SERPL CREATININE-BSD FRML MDRD: 102 ML/MIN/1.73SQ M
GLUCOSE P FAST SERPL-MCNC: 76 MG/DL (ref 65–99)
HCT VFR BLD AUTO: 38.2 % (ref 34.8–46.1)
HDLC SERPL-MCNC: 44 MG/DL
HGB BLD-MCNC: 11.9 G/DL (ref 11.5–15.4)
IMM GRANULOCYTES # BLD AUTO: 0.01 THOUSAND/UL (ref 0–0.2)
IMM GRANULOCYTES NFR BLD AUTO: 0 % (ref 0–2)
INR PPP: 0.94 (ref 0.84–1.19)
LDLC SERPL CALC-MCNC: 122 MG/DL (ref 0–100)
LYMPHOCYTES # BLD AUTO: 3 THOUSANDS/ΜL (ref 0.6–4.47)
LYMPHOCYTES NFR BLD AUTO: 55 % (ref 14–44)
MCH RBC QN AUTO: 27.3 PG (ref 26.8–34.3)
MCHC RBC AUTO-ENTMCNC: 31.2 G/DL (ref 31.4–37.4)
MCV RBC AUTO: 88 FL (ref 82–98)
MONOCYTES # BLD AUTO: 0.41 THOUSAND/ΜL (ref 0.17–1.22)
MONOCYTES NFR BLD AUTO: 7 % (ref 4–12)
NEUTROPHILS # BLD AUTO: 1.99 THOUSANDS/ΜL (ref 1.85–7.62)
NEUTS SEG NFR BLD AUTO: 36 % (ref 43–75)
NONHDLC SERPL-MCNC: 161 MG/DL
NRBC BLD AUTO-RTO: 0 /100 WBCS
PLATELET # BLD AUTO: 278 THOUSANDS/UL (ref 149–390)
PMV BLD AUTO: 10 FL (ref 8.9–12.7)
POTASSIUM SERPL-SCNC: 4.3 MMOL/L (ref 3.5–5.3)
PROT SERPL-MCNC: 7.7 G/DL (ref 6.4–8.2)
PROTHROMBIN TIME: 12.2 SECONDS (ref 11.6–14.5)
RBC # BLD AUTO: 4.36 MILLION/UL (ref 3.81–5.12)
SODIUM SERPL-SCNC: 137 MMOL/L (ref 136–145)
TRIGL SERPL-MCNC: 196 MG/DL
TSH SERPL DL<=0.05 MIU/L-ACNC: 1.58 UIU/ML (ref 0.36–3.74)
WBC # BLD AUTO: 5.55 THOUSAND/UL (ref 4.31–10.16)

## 2021-12-28 PROCEDURE — 85730 THROMBOPLASTIN TIME PARTIAL: CPT

## 2021-12-28 PROCEDURE — 84443 ASSAY THYROID STIM HORMONE: CPT

## 2021-12-28 PROCEDURE — 36415 COLL VENOUS BLD VENIPUNCTURE: CPT

## 2021-12-28 PROCEDURE — 80061 LIPID PANEL: CPT

## 2021-12-28 PROCEDURE — 80053 COMPREHEN METABOLIC PANEL: CPT

## 2021-12-28 PROCEDURE — 85610 PROTHROMBIN TIME: CPT

## 2021-12-28 PROCEDURE — 85025 COMPLETE CBC W/AUTO DIFF WBC: CPT

## 2021-12-29 ENCOUNTER — APPOINTMENT (OUTPATIENT)
Dept: LAB | Facility: CLINIC | Age: 26
End: 2021-12-29
Payer: COMMERCIAL

## 2021-12-30 ENCOUNTER — TELEPHONE (OUTPATIENT)
Dept: FAMILY MEDICINE CLINIC | Facility: CLINIC | Age: 26
End: 2021-12-30

## 2022-10-06 ENCOUNTER — OFFICE VISIT (OUTPATIENT)
Dept: FAMILY MEDICINE CLINIC | Facility: CLINIC | Age: 27
End: 2022-10-06
Payer: COMMERCIAL

## 2022-10-06 VITALS
HEART RATE: 81 BPM | OXYGEN SATURATION: 98 % | TEMPERATURE: 98 F | SYSTOLIC BLOOD PRESSURE: 120 MMHG | DIASTOLIC BLOOD PRESSURE: 70 MMHG | RESPIRATION RATE: 16 BRPM | HEIGHT: 65 IN | WEIGHT: 198 LBS | BODY MASS INDEX: 32.99 KG/M2

## 2022-10-06 DIAGNOSIS — R10.13 EPIGASTRIC PAIN: Primary | ICD-10-CM

## 2022-10-06 PROCEDURE — 99213 OFFICE O/P EST LOW 20 MIN: CPT | Performed by: NURSE PRACTITIONER

## 2022-10-06 NOTE — PROGRESS NOTES
FAMILY PRACTICE OFFICE VISIT       NAME: Ariana Saba  AGE: 32 y o  SEX: female       : 1995        MRN: [id number]    Assessment and Plan   1  Epigastric pain  -     US right upper quadrant; Future; Expected date: 10/06/2022     Symptoms concerning for biliary colic, will complete RUQ ultrasound  Office will call her with results and further plan of care pending results  Emergency room for severe symptoms  Chief Complaint     Chief Complaint   Patient presents with    Abdominal Pain     Pt is here for center abdominal pain  Comes and goes  Nausea  1 + yr       History of Present Illness     Ariana Saba is a 32year old female presenting today for epigastric pain  Intermittent abdominal pain, epigastric region  Depends on food she eats  Getting worse  Epigastric pain going through to the back  Fatty foods, buttery foods makes it worse  Ongoing for few years now  Nothing she does makes it better  Lasts about 30 minutes, sometimes little longer, but not much longer and then self resolves  Sometimes eats fatty food, and gets no symptoms  Pain is always associated with eating  Occurs about once per month  Does not usually have nausea  This past Friday, did have nausea and the worst pain ever  Weight stable  No diarrhea  No fevers  No vomiting  Review of Systems   Review of Systems   Constitutional: Negative  Gastrointestinal: Positive for abdominal pain and nausea  Negative for abdominal distention, anal bleeding, blood in stool, constipation, diarrhea, rectal pain and vomiting  I have reviewed the patient's medical history in detail; there are no changes to the history as noted in the electronic medical record      Objective     Vitals:    10/06/22 0859   BP: 120/70   Pulse: 81   Resp: 16   Temp: 98 °F (36 7 °C)   TempSrc: Temporal   SpO2: 98%   Weight: 89 8 kg (198 lb)   Height: 5' 4 69" (1 643 m)     Wt Readings from Last 3 Encounters:   10/06/22 89 8 kg (198 lb)   12/27/21 88 6 kg (195 lb 6 4 oz)   01/09/19 83 9 kg (185 lb)     Physical Exam  Vitals and nursing note reviewed  Constitutional:       General: She is not in acute distress  Appearance: She is well-developed  She is not ill-appearing  HENT:      Head: Atraumatic  Cardiovascular:      Rate and Rhythm: Normal rate and regular rhythm  Heart sounds: No murmur heard  Pulmonary:      Effort: Pulmonary effort is normal  No respiratory distress  Breath sounds: Normal breath sounds  Abdominal:      General: Bowel sounds are normal       Palpations: Abdomen is soft  Tenderness: There is no abdominal tenderness  Negative signs include Llanos's sign  Musculoskeletal:      Right lower leg: No edema  Left lower leg: No edema  Neurological:      Mental Status: She is alert  Psychiatric:         Mood and Affect: Mood normal        BMI Counseling: Body mass index is 33 27 kg/m²  The BMI is above normal  Exercise recommendations include exercising 3-5 times per week  Rationale for BMI follow-up plan is due to patient being overweight or obese  Depression Screening and Follow-up Plan: Patient was screened for depression during today's encounter  They screened negative with a PHQ-2 score of 0  ALLERGIES:  No Known Allergies    Current Medications     Current Outpatient Medications   Medication Sig Dispense Refill    norgestimate-ethinyl estradiol (ORTHO-CYCLEN) 0 25-35 MG-MCG per tablet Take 1 tablet by mouth daily       No current facility-administered medications for this visit           Health Maintenance     Health Maintenance   Topic Date Due    Hepatitis C Screening  Never done    COVID-19 Vaccine (1) Never done    HIV Screening  Never done    Annual Physical  Never done    Influenza Vaccine (1) 06/30/2023 (Originally 9/1/2022)    Depression Screening  10/06/2023    BMI: Followup Plan  10/06/2023    BMI: Adult 10/06/2023    Cervical Cancer Screening  11/13/2024    DTaP,Tdap,and Td Vaccines (6 - Td or Tdap) 01/09/2029    HIB Vaccine  Completed    Hepatitis B Vaccine  Completed    IPV Vaccine  Completed    Pneumococcal Vaccine: Pediatrics (0 to 5 Years) and At-Risk Patients (6 to 59 Years)  Aged Out    Hepatitis A Vaccine  Aged Out    Meningococcal ACWY Vaccine  Aged Out    HPV Vaccine  Aged [employer] History   Administered Date(s) Administered    DTP 1993, 1993, 06/10/1994, 04/21/1997, 07/21/1998    HPV9 11/13/2019    Hep B, Adolescent or Pediatric 1993, 1993, 06/10/1994, 07/01/1998    HiB 1993, 1993, 1993, 06/10/1994, 04/21/1997    IPV 1993, 1993, 1993, 07/21/1998    MMR 01/01/1997, 04/21/1997, 01/01/1998, 07/21/1998    Meningococcal Polysaccharide (MPSV4) 01/15/2009    OPV 1993, 1993, 06/10/1994, 07/21/1998    Tdap 01/09/2019    Tetanus, adsorbed 1993, 1993, 06/10/1994, 04/02/1997, 09/01/1998, 01/15/2009    Varicella 09/16/1998, 06/14/2011       Cristel Jenkins

## 2022-10-11 ENCOUNTER — HOSPITAL ENCOUNTER (OUTPATIENT)
Dept: RADIOLOGY | Facility: HOSPITAL | Age: 27
Discharge: HOME | End: 2022-10-11
Payer: COMMERCIAL

## 2022-10-11 DIAGNOSIS — R10.13 EPIGASTRIC PAIN: ICD-10-CM

## 2022-10-11 PROCEDURE — 76705 ECHO EXAM OF ABDOMEN: CPT

## 2022-10-12 ENCOUNTER — HOSPITAL ENCOUNTER (EMERGENCY)
Facility: HOSPITAL | Age: 27
Discharge: HOME | End: 2022-10-12
Attending: EMERGENCY MEDICINE
Payer: COMMERCIAL

## 2022-10-12 ENCOUNTER — APPOINTMENT (EMERGENCY)
Dept: RADIOLOGY | Facility: HOSPITAL | Age: 27
End: 2022-10-12
Payer: COMMERCIAL

## 2022-10-12 ENCOUNTER — TELEPHONE (OUTPATIENT)
Dept: FAMILY MEDICINE CLINIC | Facility: CLINIC | Age: 27
End: 2022-10-12

## 2022-10-12 VITALS
DIASTOLIC BLOOD PRESSURE: 98 MMHG | HEART RATE: 66 BPM | TEMPERATURE: 98.1 F | OXYGEN SATURATION: 100 % | RESPIRATION RATE: 18 BRPM | SYSTOLIC BLOOD PRESSURE: 157 MMHG

## 2022-10-12 DIAGNOSIS — R10.13 EPIGASTRIC ABDOMINAL PAIN: Primary | ICD-10-CM

## 2022-10-12 DIAGNOSIS — R10.13 EPIGASTRIC PAIN: Primary | ICD-10-CM

## 2022-10-12 LAB
ALBUMIN SERPL BCP-MCNC: 3.1 G/DL (ref 3.5–5)
ALP SERPL-CCNC: 74 U/L (ref 46–116)
ALT SERPL W P-5'-P-CCNC: 20 U/L (ref 12–78)
ANION GAP SERPL CALCULATED.3IONS-SCNC: 5 MMOL/L (ref 4–13)
AST SERPL W P-5'-P-CCNC: 14 U/L (ref 5–45)
BASOPHILS # BLD AUTO: 0.03 THOUSANDS/ΜL (ref 0–0.1)
BASOPHILS NFR BLD AUTO: 0 % (ref 0–1)
BILIRUB SERPL-MCNC: 0.18 MG/DL (ref 0.2–1)
BILIRUB UR QL STRIP: NEGATIVE
BUN SERPL-MCNC: 9 MG/DL (ref 5–25)
CALCIUM ALBUM COR SERPL-MCNC: 9.7 MG/DL (ref 8.3–10.1)
CALCIUM SERPL-MCNC: 9 MG/DL (ref 8.3–10.1)
CHLORIDE SERPL-SCNC: 108 MMOL/L (ref 96–108)
CLARITY UR: CLEAR
CO2 SERPL-SCNC: 25 MMOL/L (ref 21–32)
COLOR UR: COLORLESS
CREAT SERPL-MCNC: 0.9 MG/DL (ref 0.6–1.3)
EOSINOPHIL # BLD AUTO: 0.45 THOUSAND/ΜL (ref 0–0.61)
EOSINOPHIL NFR BLD AUTO: 5 % (ref 0–6)
ERYTHROCYTE [DISTWIDTH] IN BLOOD BY AUTOMATED COUNT: 12.6 % (ref 11.6–15.1)
EXT PREG TEST URINE: NEGATIVE
EXT. CONTROL ED NAV: NORMAL
GFR SERPL CREATININE-BSD FRML MDRD: 86 ML/MIN/1.73SQ M
GGT SERPL-CCNC: 11 U/L (ref 5–85)
GLUCOSE SERPL-MCNC: 91 MG/DL (ref 65–140)
GLUCOSE UR STRIP-MCNC: NEGATIVE MG/DL
HCT VFR BLD AUTO: 38.2 % (ref 34.8–46.1)
HGB BLD-MCNC: 12.4 G/DL (ref 11.5–15.4)
HGB UR QL STRIP.AUTO: NEGATIVE
IMM GRANULOCYTES # BLD AUTO: 0.02 THOUSAND/UL (ref 0–0.2)
IMM GRANULOCYTES NFR BLD AUTO: 0 % (ref 0–2)
KETONES UR STRIP-MCNC: NEGATIVE MG/DL
LEUKOCYTE ESTERASE UR QL STRIP: NEGATIVE
LIPASE SERPL-CCNC: 200 U/L (ref 73–393)
LYMPHOCYTES # BLD AUTO: 3.66 THOUSANDS/ΜL (ref 0.6–4.47)
LYMPHOCYTES NFR BLD AUTO: 38 % (ref 14–44)
MCH RBC QN AUTO: 27.7 PG (ref 26.8–34.3)
MCHC RBC AUTO-ENTMCNC: 32.5 G/DL (ref 31.4–37.4)
MCV RBC AUTO: 86 FL (ref 82–98)
MONOCYTES # BLD AUTO: 0.63 THOUSAND/ΜL (ref 0.17–1.22)
MONOCYTES NFR BLD AUTO: 7 % (ref 4–12)
NEUTROPHILS # BLD AUTO: 4.85 THOUSANDS/ΜL (ref 1.85–7.62)
NEUTS SEG NFR BLD AUTO: 50 % (ref 43–75)
NITRITE UR QL STRIP: NEGATIVE
NRBC BLD AUTO-RTO: 0 /100 WBCS
PH UR STRIP.AUTO: 6.5 [PH]
PLATELET # BLD AUTO: 295 THOUSANDS/UL (ref 149–390)
PMV BLD AUTO: 9.6 FL (ref 8.9–12.7)
POTASSIUM SERPL-SCNC: 3.8 MMOL/L (ref 3.5–5.3)
PROT SERPL-MCNC: 7.5 G/DL (ref 6.4–8.4)
PROT UR STRIP-MCNC: NEGATIVE MG/DL
RBC # BLD AUTO: 4.47 MILLION/UL (ref 3.81–5.12)
SODIUM SERPL-SCNC: 138 MMOL/L (ref 135–147)
SP GR UR STRIP.AUTO: 1.01 (ref 1–1.03)
UROBILINOGEN UR STRIP-ACNC: <2 MG/DL
WBC # BLD AUTO: 9.64 THOUSAND/UL (ref 4.31–10.16)

## 2022-10-12 PROCEDURE — 99284 EMERGENCY DEPT VISIT MOD MDM: CPT | Performed by: EMERGENCY MEDICINE

## 2022-10-12 PROCEDURE — 81003 URINALYSIS AUTO W/O SCOPE: CPT | Performed by: STUDENT IN AN ORGANIZED HEALTH CARE EDUCATION/TRAINING PROGRAM

## 2022-10-12 PROCEDURE — G1004 CDSM NDSC: HCPCS

## 2022-10-12 PROCEDURE — 99284 EMERGENCY DEPT VISIT MOD MDM: CPT

## 2022-10-12 PROCEDURE — 74177 CT ABD & PELVIS W/CONTRAST: CPT

## 2022-10-12 PROCEDURE — 81025 URINE PREGNANCY TEST: CPT | Performed by: STUDENT IN AN ORGANIZED HEALTH CARE EDUCATION/TRAINING PROGRAM

## 2022-10-12 PROCEDURE — 80053 COMPREHEN METABOLIC PANEL: CPT | Performed by: STUDENT IN AN ORGANIZED HEALTH CARE EDUCATION/TRAINING PROGRAM

## 2022-10-12 PROCEDURE — 85025 COMPLETE CBC W/AUTO DIFF WBC: CPT | Performed by: STUDENT IN AN ORGANIZED HEALTH CARE EDUCATION/TRAINING PROGRAM

## 2022-10-12 PROCEDURE — 36415 COLL VENOUS BLD VENIPUNCTURE: CPT | Performed by: STUDENT IN AN ORGANIZED HEALTH CARE EDUCATION/TRAINING PROGRAM

## 2022-10-12 PROCEDURE — 82977 ASSAY OF GGT: CPT | Performed by: STUDENT IN AN ORGANIZED HEALTH CARE EDUCATION/TRAINING PROGRAM

## 2022-10-12 PROCEDURE — 83690 ASSAY OF LIPASE: CPT | Performed by: STUDENT IN AN ORGANIZED HEALTH CARE EDUCATION/TRAINING PROGRAM

## 2022-10-12 RX ORDER — LIDOCAINE HYDROCHLORIDE 20 MG/ML
15 SOLUTION OROPHARYNGEAL ONCE
Status: COMPLETED | OUTPATIENT
Start: 2022-10-12 | End: 2022-10-12

## 2022-10-12 RX ORDER — ONDANSETRON 2 MG/ML
4 INJECTION INTRAMUSCULAR; INTRAVENOUS ONCE
Status: DISCONTINUED | OUTPATIENT
Start: 2022-10-12 | End: 2022-10-12 | Stop reason: HOSPADM

## 2022-10-12 RX ORDER — ACETAMINOPHEN 325 MG/1
650 TABLET ORAL ONCE
Status: COMPLETED | OUTPATIENT
Start: 2022-10-12 | End: 2022-10-12

## 2022-10-12 RX ORDER — PANTOPRAZOLE SODIUM 40 MG/1
40 TABLET, DELAYED RELEASE ORAL
Qty: 30 TABLET | Refills: 1 | Status: SHIPPED | OUTPATIENT
Start: 2022-10-12

## 2022-10-12 RX ORDER — SUCRALFATE 1 G/1
1 TABLET ORAL ONCE
Status: COMPLETED | OUTPATIENT
Start: 2022-10-12 | End: 2022-10-12

## 2022-10-12 RX ADMIN — IOHEXOL 100 ML: 350 INJECTION, SOLUTION INTRAVENOUS at 07:29

## 2022-10-12 RX ADMIN — SUCRALFATE 1 G: 1 TABLET ORAL at 04:26

## 2022-10-12 RX ADMIN — LIDOCAINE HYDROCHLORIDE 15 ML: 20 SOLUTION ORAL; TOPICAL at 04:26

## 2022-10-12 RX ADMIN — ACETAMINOPHEN 650 MG: 325 TABLET, FILM COATED ORAL at 04:26

## 2022-10-12 NOTE — DISCHARGE INSTRUCTIONS
You have been evaluated in the Emergency Department today for abdominal pain  Your evaluation was not suggestive of any emergent condition requiring medical intervention at this time  However, some abdominal problems make take more time to appear  Therefore, it is important for you to watch for any new symptoms or worsening of your current condition  Please follow up with your primary care physician  If you do not have a primary doctor, you can call "[facility]" to schedule an appointment with one  You should also follow up with gastroenterology  A referral was sent  Please call to schedule an appointment  Return to the Emergency Department if you experience worsening or uncontrolled pain, fevers 100 4°F or greater, recurrent vomiting, inability to tolerate food or fluids by mouth, bloody stools or vomit, black or tarry stools, or any other concerning symptoms

## 2022-10-12 NOTE — ED ATTENDING ATTESTATION
10/12/2022  IChau MD, saw and evaluated the patient  I have discussed the patient with the resident/non-physician practitioner and agree with the resident's/non-physician practitioner's findings, Plan of Care, and MDM as documented in the resident's/non-physician practitioner's note, except where noted  All available labs and Radiology studies were reviewed  I was present for key portions of any procedure(s) performed by the resident/non-physician practitioner and I was immediately available to provide assistance  At this point I agree with the current assessment done in the Emergency Department  I have conducted an independent evaluation of this patient a history and physical is as follows:    ED Course     Emergency Department Note- Coy Contreras 32 y o  female MRN: [id number]    Unit/Bed#: ED 21 Encounter: [id number]    Coy Contreras is a 32 y o  female who presents with   Chief Complaint   Patient presents with   • Abdominal Pain     Pt has been ongoing GI issues which includes abdominal & middle back pain w/ nausea, had ultrasound today to check gallbladder, liver, & kidney  Pt ate dinner tonight when she states she had a flare & pain hasn't gone away         History of Present Illness   HPI:  Coy Contreras is a 32 y o  female who presents for evaluation of: Moderate intensity midepigastric/epigastric pain that radiates to her midback and has been intermittent since 10:00 p m     The patient has been having intermittent right upper quadrant abdominal pain over the last several months  She had a ultrasound of her gallbladder done yesterday  She notes some mild associated nausea but no vomiting  She notes that eating often precipitate discomfort; fatty meals are more likely to precipitate the abdominal discomfort  Patient did not eat anything fatty last night with dinner; she had a salad for dinner  Review of Systems   Constitutional: Negative for chills and fever     HENT: Negative for congestion and rhinorrhea  Respiratory: Negative for cough and shortness of breath  Cardiovascular: Negative for chest pain and palpitations  Gastrointestinal: Positive for nausea  Negative for vomiting  Musculoskeletal: Negative for back pain and neck pain  Neurological: Negative for light-headedness and headaches  All other systems reviewed and are negative  Historical Information   History reviewed  No pertinent past medical history  Past Surgical History:   Procedure Laterality Date   • BUNIONECTOMY Right 2007   • TONSILLECTOMY Bilateral 2005     Social History   Social History     Substance and Sexual Activity   Alcohol Use Yes    Comment: socially     Social History     Substance and Sexual Activity   Drug Use No     Social History     Tobacco Use   Smoking Status Never Smoker   Smokeless Tobacco Never Used     Family History:   Family History   Problem Relation Age of Onset   • Depression Mother    • Anxiety disorder Mother    • Bipolar disorder Mother    • Carpal tunnel syndrome Mother    • Colon cancer Mother 62   • Diabetes Father    • Heart attack Father    • No Known Problems Sister    • Cancer Maternal Grandmother    • No Known Problems Maternal Grandfather    • Cancer Paternal Grandmother    • Diabetes Paternal Grandfather    • Cancer Maternal Uncle        Meds/Allergies   PTA meds:   Prior to Admission Medications   Prescriptions Last Dose Informant Patient Reported?  Taking?   norgestimate-ethinyl estradiol (ORTHO-CYCLEN) 0 25-35 MG-MCG per tablet  Self Yes No   Sig: Take 1 tablet by mouth daily      Facility-Administered Medications: None     No Known Allergies    Objective   First Vitals:   Blood Pressure: 157/98 (10/12/22 0354)  Pulse: 66 (10/12/22 0354)  Temperature: 98 1 °F (36 7 °C) (10/12/22 0354)  Temp Source: Oral (10/12/22 0354)  Respirations: 18 (10/12/22 0354)  SpO2: 100 % (10/12/22 0354)    Current Vitals:   Blood Pressure: 157/98 (10/12/22 0354)  Pulse: 66 (10/12/22 0354)  Temperature: 98 1 °F (36 7 °C) (10/12/22 0354)  Temp Source: Oral (10/12/22 0354)  Respirations: 18 (10/12/22 0354)  SpO2: 100 % (10/12/22 0354)    No intake or output data in the 24 hours ending 10/12/22 042    Invasive Devices  Report    Peripheral Intravenous Line  Duration           Peripheral IV 10/12/22 Left Antecubital <1 day                Physical Exam  Vitals and nursing note reviewed  Constitutional:       General: She is not in acute distress  Appearance: Normal appearance  She is well-developed  HENT:      Head: Normocephalic and atraumatic  Right Ear: External ear normal       Left Ear: External ear normal       Nose: Nose normal       Mouth/Throat:      Pharynx: No oropharyngeal exudate  Eyes:      Conjunctiva/sclera: Conjunctivae normal       Pupils: Pupils are equal, round, and reactive to light  Cardiovascular:      Rate and Rhythm: Normal rate and regular rhythm  Pulmonary:      Effort: Pulmonary effort is normal  No respiratory distress  Abdominal:      General: Abdomen is flat  There is no distension  Palpations: Abdomen is soft  Musculoskeletal:         General: No deformity  Normal range of motion  Cervical back: Normal range of motion and neck supple  Skin:     General: Skin is warm and dry  Capillary Refill: Capillary refill takes less than 2 seconds  Neurological:      General: No focal deficit present  Mental Status: She is alert and oriented to person, place, and time  Mental status is at baseline  Coordination: Coordination normal    Psychiatric:         Mood and Affect: Mood normal          Behavior: Behavior normal          Thought Content: Thought content normal          Judgment: Judgment normal            Medical Decision Makin  Acute abdominal pain:  CBC, CMP, GGT; medicines for pain control  No results found for this or any previous visit (from the past 36 hour(s))    CT Abdomen pelvis with contrast (Results Pending)         Portions of the record may have been created with voice recognition software  Occasional wrong word or "sound a like" substitutions may have occurred due to the inherent limitations of voice recognition software  Read the chart carefully and recognize, using context, where substitutions have occurred          Critical Care Time  Procedures

## 2022-10-12 NOTE — Clinical Note
Eloy Hernandez was seen and treated in our emergency department on 10/12/2022  Diagnosis:     Kushal [place]  may return to work on return date  She may return on this date: 10/14/2022    May return on 10/13 only if feeling better     If you have any questions or concerns, please don't hesitate to call        Jaime Masterson, DO    ______________________________           _______________          _______________  Hospital Representative                              Date                                Time

## 2022-10-12 NOTE — TELEPHONE ENCOUNTER
----- Message from [employer] sent at 10/12/2022  8:49 AM EDT -----  Right upper quadrant ultrasound shows no abnormalities  I did review her emergency room visit from this morning  I would like her to start a medication, pantoprazole 40 mg daily in the morning 30 minutes before breakfast  Prescription sent to [facility]  This is a medication that reduces the amount of acid your stomach makes, this treats acid reflux, gastritis, ulcers  We need to consider these as possible diagnoses for you  I also would like you to follow-up with a gastroenterologist, as soon as able  Please call [facility] gastroenterology [phone]

## 2022-10-12 NOTE — ED PROVIDER NOTES
History  Chief Complaint   Patient presents with   • Abdominal Pain     Pt has been ongoing GI issues which includes abdominal & middle back pain w/ nausea, had ultrasound today to check gallbladder, liver, & kidney  Pt ate dinner tonight when she states she had a flare & pain hasn't gone away     Patient is a 17-year-old female, no significant past medical history, who presents to the emergency department for epigastric abdominal pain  Patient states she has been dealing with this pain for about a year  It has been intermittent, occurring infrequently  She states she was recently evaluated by her primary care doctor for this  An outpatient right upper quadrant ultrasound was performed  She has not gotten the results back yet  This evening, after eating dinner, she again developed sudden onset epigastric abdominal pain  She describes it as a pressure  It is waxing and waning, with radiation to the middle of her back  She states certain positions make it better, and certain foods seem to make it worse  Usually the pain only lasts for around 30 minutes, but this time the pain is lasted much longer which is what prompted her visit to the emergency department tonight  There are no other modifying factors  Associated symptoms include nausea  She denies any fever, chills, vomiting, diarrhea, chest pain, shortness of breath, urinary symptoms, or any other new concerning symptoms  She denies any prior history of alcohol use  She denies any drug use, or tobacco use  Abdominal Pain  Associated symptoms: nausea    Associated symptoms: no chest pain, no chills, no cough, no diarrhea, no dysuria, no fever, no hematuria, no shortness of breath, no sore throat and no vomiting        Prior to Admission Medications   Prescriptions Last Dose Informant Patient Reported?  Taking?   norgestimate-ethinyl estradiol (ORTHO-CYCLEN) 0 25-35 MG-MCG per tablet  Self Yes No   Sig: Take 1 tablet by mouth daily Facility-Administered Medications: None       History reviewed  No pertinent past medical history  Past Surgical History:   Procedure Laterality Date   • BUNIONECTOMY Right 2007   • TONSILLECTOMY Bilateral 2005       Family History   Problem Relation Age of Onset   • Depression Mother    • Anxiety disorder Mother    • Bipolar disorder Mother    • Carpal tunnel syndrome Mother    • Colon cancer Mother 62   • Diabetes Father    • Heart attack Father    • No Known Problems Sister    • Cancer Maternal Grandmother    • No Known Problems Maternal Grandfather    • Cancer Paternal Grandmother    • Diabetes Paternal Grandfather    • Cancer Maternal Uncle      I have reviewed and agree with the history as documented  E-Cigarette/Vaping   • E-Cigarette Use Never User      E-Cigarette/Vaping Substances     Social History     Tobacco Use   • Smoking status: Never Smoker   • Smokeless tobacco: Never Used   Vaping Use   • Vaping Use: Never used   Substance Use Topics   • Alcohol use: Yes     Comment: socially   • Drug use: No        Review of Systems   Constitutional: Negative for chills and fever  HENT: Negative for sore throat and trouble swallowing  Respiratory: Negative for cough and shortness of breath  Cardiovascular: Negative for chest pain  Gastrointestinal: Positive for abdominal pain and nausea  Negative for diarrhea and vomiting  Genitourinary: Negative for difficulty urinating, dysuria and hematuria  Musculoskeletal: Positive for back pain  Negative for neck pain  All other systems reviewed and are negative        Physical Exam  ED Triage Vitals [10/12/22 0354]   Temperature Pulse Respirations Blood Pressure SpO2   98 1 °F (36 7 °C) 66 18 157/98 100 %      Temp Source Heart Rate Source Patient Position - Orthostatic VS BP Location FiO2 (%)   Oral Monitor Lying Right arm --      Pain Score       6             Orthostatic Vital Signs  Vitals:    10/12/22 0354   BP: 157/98   Pulse: 66   Patient Position - Orthostatic VS: Lying       Physical Exam  Vitals and nursing note reviewed  Constitutional:       General: She is not in acute distress  Appearance: She is well-developed  She is not diaphoretic  HENT:      Head: Normocephalic and atraumatic  Right Ear: External ear normal       Left Ear: External ear normal       Nose: Nose normal    Eyes:      General: Lids are normal  No scleral icterus  Cardiovascular:      Rate and Rhythm: Normal rate and regular rhythm  Heart sounds: Normal heart sounds  No murmur heard  No friction rub  No gallop  Pulmonary:      Effort: Pulmonary effort is normal  No respiratory distress  Breath sounds: Normal breath sounds  No wheezing or rales  Abdominal:      Palpations: Abdomen is soft  Tenderness: There is abdominal tenderness in the epigastric area  There is no guarding or rebound  Musculoskeletal:         General: No deformity  Normal range of motion  Cervical back: Normal range of motion and neck supple  Skin:     General: Skin is warm and dry  Neurological:      General: No focal deficit present  Mental Status: She is alert     Psychiatric:         Mood and Affect: Mood normal          Behavior: Behavior normal          ED Medications  Medications   ondansetron (ZOFRAN) injection 4 mg (4 mg Intravenous Not Given 10/12/22 0429)   acetaminophen (TYLENOL) tablet 650 mg (650 mg Oral Given 10/12/22 0426)   sucralfate (CARAFATE) tablet 1 g (1 g Oral Given 10/12/22 0426)   Lidocaine Viscous HCl (XYLOCAINE) 2 % mucosal solution 15 mL (15 mL Swish & Swallow Given 10/12/22 0426)       Diagnostic Studies  Results Reviewed     Procedure Component Value Units Date/Time    CMP [[id number]]  (Abnormal) Collected: 10/12/22 0612    Lab Status: Final result Specimen: Blood from Arm, Left Updated: 10/12/22 0707     Sodium 138 mmol/L      Potassium 3 8 mmol/L      Chloride 108 mmol/L      CO2 25 mmol/L      ANION GAP 5 mmol/L      BUN 9 mg/dL      Creatinine 0 90 mg/dL      Glucose 91 mg/dL      Calcium 9 0 mg/dL      Corrected Calcium 9 7 mg/dL      AST 14 U/L      ALT 20 U/L      Alkaline Phosphatase 74 U/L      Total Protein 7 5 g/dL      Albumin 3 1 g/dL      Total Bilirubin 0 18 mg/dL      eGFR 86 ml/min/1 73sq m     Narrative:      National Kidney Disease Foundation guidelines for Chronic Kidney Disease (CKD):   •  Stage 1 with normal or high GFR (GFR > 90 mL/min/1 73 square meters)  •  Stage 2 Mild CKD (GFR = 60-89 mL/min/1 73 square meters)  •  Stage 3A Moderate CKD (GFR = 45-59 mL/min/1 73 square meters)  •  Stage 3B Moderate CKD (GFR = 30-44 mL/min/1 73 square meters)  •  Stage 4 Severe CKD (GFR = 15-29 mL/min/1 73 square meters)  •  Stage 5 End Stage CKD (GFR <15 mL/min/1 73 square meters)  Note: GFR calculation is accurate only with a steady state creatinine    Lipase [[id number]]  (Normal) Collected: 10/12/22 0612    Lab Status: Final result Specimen: Blood from Arm, Left Updated: 10/12/22 0707     Lipase 200 u/L     Gamma GT [[id number]]  (Normal) Collected: 10/12/22 0424    Lab Status: Final result Specimen: Blood from Arm, Left Updated: 10/12/22 0458     GGT 11 U/L     UA w Reflex to Microscopic w Reflex to Culture [[id number]] Collected: 10/12/22 0432    Lab Status: Final result Specimen: Urine, Clean Catch Updated: 10/12/22 0452     Color, UA Colorless     Clarity, UA Clear     Specific Gravity, UA 1 007     pH, UA 6 5     Leukocytes, UA Negative     Nitrite, UA Negative     Protein, UA Negative mg/dl      Glucose, UA Negative mg/dl      Ketones, UA Negative mg/dl      Urobilinogen, UA <2 0 mg/dl      Bilirubin, UA Negative     Occult Blood, UA Negative    CBC and differential [[id number]] Collected: 10/12/22 0424    Lab Status: Final result Specimen: Blood from Arm, Left Updated: 10/12/22 0443     WBC 9 64 Thousand/uL      RBC 4 47 Million/uL      Hemoglobin 12 4 g/dL      Hematocrit 38 2 %      MCV 86 fL      MCH 27 7 pg      MCHC 32 5 g/dL      RDW 12 6 %      MPV 9 6 fL      Platelets 501 Thousands/uL      nRBC 0 /100 WBCs      Neutrophils Relative 50 %      Immat GRANS % 0 %      Lymphocytes Relative 38 %      Monocytes Relative 7 %      Eosinophils Relative 5 %      Basophils Relative 0 %      Neutrophils Absolute 4 85 Thousands/µL      Immature Grans Absolute 0 02 Thousand/uL      Lymphocytes Absolute 3 66 Thousands/µL      Monocytes Absolute 0 63 Thousand/µL      Eosinophils Absolute 0 45 Thousand/µL      Basophils Absolute 0 03 Thousands/µL     POCT pregnancy, urine [[id number]]  (Normal) Resulted: 10/12/22 0439    Lab Status: Final result Updated: 10/12/22 0439     EXT PREG TEST UR (Ref: Negative) negative     Control valid                 CT Abdomen pelvis with contrast    (Results Pending)         Procedures  Procedures      ED Course  ED Course as of 10/12/22 0710   Wed Oct 12, 2022   0441 PREGNANCY TEST URINE: negative   0453 CBC and differential   0453 UA w Reflex to Microscopic w Reflex to Culture   0608 Patient re-evaluated  Resting comfortably  States pain substantially improved  Pending CT scan and CMP    0617 Notified by nurse that lipase and CMP need to be redrawn  7817 Patient signed out to Dr Rafaela Jenkins  Pending CT and labs  Please see Dr Rafaela Jenkins note for disposition  SBIRT 22yo+    Flowsheet Row Most Recent Value   SBIRT (25 yo +)    In order to provide better care to our patients, we are screening all of our patients for alcohol and drug use  Would it be okay to ask you these screening questions? Yes Filed at: 10/12/2022 0357   Initial Alcohol Screen: US AUDIT-C     1  How often do you have a drink containing alcohol? 0 Filed at: 10/12/2022 0357   2  How many drinks containing alcohol do you have on a typical day you are drinking? 0 Filed at: 10/12/2022 0357   3b  FEMALE Any Age, or MALE 65+: How often do you have 4 or more drinks on one occassion?  0 Filed at: 10/12/2022 0357   Audit-C Score 0 Filed at: 10/12/2022 7746   REGULO: How many times in the past year have you    Used an illegal drug or used a prescription medication for non-medical reasons? Never Filed at: 10/12/2022 0357                MDM  Number of Diagnoses or Management Options  Diagnosis management comments: Patient is a 32 y o  female who presents to the ED for acute on chronic epigastric abdominal pain  Patient nontoxic, well appearing  Vital stable  On exam, patient has moderate epigastric tenderness to palpation  Abdomen is soft, without peritoneal signs  No evidence of acute abdomen  Differential diagnosis includes:  Pancreatitis, gastritis, hepatobiliary disease (although less likely given normal appearing recent RUQ ultrasound)  Considered, but think less likely symptoms secondary to PUD (including perforation), acute infectious processes (pneumonia, hepatitis, pyelonephritis), acute appendicitis, vascular catastrophe, bowel obstruction or viscus perforation  Presentation not consistent with other acute, emergent causes of abdominal pain at this time  Plan: labs, UA, CT AP, pain control, serial reassessment                 Portions of the record may have been created with voice recognition software  Occasional wrong word or "sound a like" substitutions may have occurred due to the inherent limitations of voice recognition software  Read the chart carefully and recognize, using context, where substitutions have occurred         Amount and/or Complexity of Data Reviewed  Clinical lab tests: ordered  Tests in the radiology section of CPT®: ordered  Tests in the medicine section of CPT®: ordered  Review and summarize past medical records: yes  Independent visualization of images, tracings, or specimens: yes    Risk of Complications, Morbidity, and/or Mortality  Presenting problems: low  Diagnostic procedures: moderate  Management options: low    Patient Progress  Patient progress: stable      Disposition  Final diagnoses: Epigastric abdominal pain     Time reflects when diagnosis was documented in both MDM as applicable and the Disposition within this note     Time User Action Codes Description Comment    10/12/2022  6:28 AM Sherwin Cano Add [R10 13] Epigastric abdominal pain       ED Disposition     None      Follow-up Information     Follow up With Specialties Details Why Contact Info Additional Information    Cristel Sun, [address] [facility], Nurse Practitioner   [address] [facility] [phone]       [address] [place] [address] Emergency Department Emergency Medicine  As needed [address] [id number]  [address] [facility] [address] [facility] Emergency Department, [address], [place], [place], [address] [facility] Call   [address] [address] [phone] [facility], [address], [place], [place], [address] [phone]          Patient's Medications   Discharge Prescriptions    No medications on file         PDMP Review     None           ED Provider  Attending physically available and evaluated Az Gavin I managed the patient along with the ED Attending      Electronically Signed by         Delio Gonzalez DO  10/12/22 6548

## 2022-10-12 NOTE — TELEPHONE ENCOUNTER
Spoke with patient  Made aware of results and provider's instructions  Patient verbalized understanding and was agreeable w/ plan  Tel # for [facility] provided

## 2022-10-19 ENCOUNTER — OFFICE VISIT (OUTPATIENT)
Dept: GASTROENTEROLOGY | Facility: CLINIC | Age: 27
End: 2022-10-19
Payer: COMMERCIAL

## 2022-10-19 VITALS
DIASTOLIC BLOOD PRESSURE: 72 MMHG | SYSTOLIC BLOOD PRESSURE: 106 MMHG | HEIGHT: 65 IN | TEMPERATURE: 98.1 F | WEIGHT: 193.8 LBS | BODY MASS INDEX: 32.29 KG/M2

## 2022-10-19 DIAGNOSIS — R10.13 EPIGASTRIC PAIN: ICD-10-CM

## 2022-10-19 PROCEDURE — 99204 OFFICE O/P NEW MOD 45 MIN: CPT | Performed by: PHYSICIAN ASSISTANT

## 2022-10-19 NOTE — PATIENT INSTRUCTIONS
GERD (Gastroesophageal Reflux Disease)   WHAT YOU NEED TO KNOW:   Gastroesophageal reflux disease (GERD) is reflux that happens more than 2 times a week for a few weeks  Reflux means acid and food in your stomach back up into your esophagus  GERD can cause other health problems over time if it is not treated  DISCHARGE INSTRUCTIONS:   Call your local emergency number (911 in the [address] [facility] [facility]) if:   You have severe chest pain and sudden trouble breathing  Return to the emergency department if:   You have trouble breathing after you vomit  You have trouble swallowing, or pain with swallowing  Your bowel movements are black, bloody, or tarry-looking  Your vomit looks like coffee grounds or has blood in it  Call your doctor or gastroenterologist if:   You feel full and cannot burp or vomit  You vomit large amounts, or you vomit often  You are losing weight without trying  Your symptoms get worse or do not improve with treatment  You have questions or concerns about your condition or care  Medicines:   Medicines  are used to decrease stomach acid  Medicine may also be used to help your lower esophageal sphincter and stomach contract (tighten) more  Take your medicine as directed  Contact your healthcare provider if you think your medicine is not helping or if you have side effects  Tell him of her if you are allergic to any medicine  Keep a list of the medicines, vitamins, and herbs you take  Include the amounts, and when and why you take them  Bring the list or the pill bottles to follow-up visits  Carry your medicine list with you in case of an emergency  Manage GERD:       Do not have foods or drinks that may increase heartburn  These include chocolate, peppermint, fried or fatty foods, drinks that contain caffeine, or carbonated drinks (soda)  Other foods include spicy foods, onions, tomatoes, and tomato-based foods   Do not have foods or drinks that can irritate your esophagus, such as citrus fruits, juices, and alcohol  Do not eat large meals  When you eat a lot of food at one time, your stomach needs more acid to digest it  Eat 6 small meals each day instead of 3 large ones, and eat slowly  Do not eat meals 2 to 3 hours before bedtime  Elevate the head of your bed  Place 6-inch blocks under the head of your bed frame  You may also use more than one pillow under your head and shoulders while you sleep  Maintain a healthy weight  If you are overweight, weight loss may help relieve symptoms of GERD  Do not smoke  Smoking weakens the lower esophageal sphincter and increases the risk of GERD  Ask your healthcare provider for information if you currently smoke and need help to quit  E-cigarettes or smokeless tobacco still contain nicotine  Talk to your healthcare provider before you use these products  Do not put pressure on your abdomen  Pressure pushes acid up into your esophagus  Do not wear clothing that is tight around your waist  Do not bend over  Bend at the knees if you need to pick something up  Follow up with your doctor or gastroenterologist as directed:  Write down your questions so you remember to ask them during your visits  © Copyright [employer] 2022 Information is for End User's use only and may not be sold, redistributed or otherwise used for commercial purposes  All illustrations and images included in CareNotes® are the copyrighted property of [employer]  or [employer]  The above information is an  only  It is not intended as medical advice for individual conditions or treatments  Talk to your doctor, nurse or pharmacist before following any medical regimen to see if it is safe and effective for you

## 2022-10-19 NOTE — PROGRESS NOTES
[facility] between [facility] - Outpatient Consultation  Andrzej Quintana 32 y o  female MRN: [id number]  Encounter: [id number]          ASSESSMENT AND PLAN:      Marianela Rios is a 33 y/o female who presents for consultation of epigastric pain  1  Epigastric pain  Pt has had intermittent epigastric pain for the last 10 yrs or so but over the last 2 yrs, it has started to occur more frequently  She says there are some weeks she does not feel the pain at all but most weeks, she feels it with eating at least 2-3 times/week and is "severe severe " RUQ u/s on 10/11 was essentially WNL without any GB/biliary findings  CT A/P 10/12 was also WNL  Lipase and LFTs WNL  Pt says that she is taking protonix 40 mg daily and is no longer having pain   -I explained to pt that her symptoms may be due to poorly controlled reflux VS H pylori VS celiac disease VS hiatal hernia VS biliary dyskinesia; with her improvement on PPI therapy, we can proceed with conservative management and then undergo EGD if this fails  Pt says she would like to think about this   -continue prootnix 40 mg daily  -anti-gerd diet and lifestyle management discussed  -I asked pt to think about EGD and let us know: if she would like to proceed with one at this time, that is reasonable and we can schedule this  If not, we can proceed with conservative management and if she still continues to have symptoms, then EGD at that time    2  Family hx of colon cancer in mother   Pt says her mother was dx with colon cancer at age 54  Pt denies changes in bowel habits, constitutional symptoms, abdominal pain, bloody or black BMs    -according to guidelines, pt would be due for colonoscopy at age 34 but pt says if she wants to undergo EGD, she would like colonoscopy done at the same time  ______________________________________________________________________    HPI:  Marianela Rios is a 33 y/o female who presents for consultation of epigastric pain   The pt says that for the last 10 yrs or so, she would get intermittent epigastric pain that radiates to her back  She says that this would only occur once in a while but as of the last 2 years or so, it has been occurring about 2-3 times/week and it is "very severe " Pt says that she has noticed it come on with eating but is unsure if anything specific triggers it and has not paid attention if it is related to activity or BM  Pt denies constipation, diarrhea, NSAID use, trouble swallowing, heartburn or reflux  Pt does have family hx of colon cancer in her mother, dx at age 54 she believes, but the pt denies any unintentional weight loss, fevers, chills, night sweats  Pt denies prior abdominal surgical hx  Pt is not on blood thinners  REVIEW OF SYSTEMS:    CONSTITUTIONAL: Denies any fever, chills, rigors, and weight loss  HEENT: No earache or tinnitus  Denies hearing loss or visual disturbances  CARDIOVASCULAR: No chest pain or palpitations  RESPIRATORY: Denies any cough, hemoptysis, shortness of breath or dyspnea on exertion  GASTROINTESTINAL: As noted in the History of Present Illness  GENITOURINARY: No problems with urination  Denies any hematuria or dysuria  NEUROLOGIC: No dizziness or vertigo, denies headaches  MUSCULOSKELETAL: Denies any muscle or joint pain  SKIN: Denies skin rashes or itching  ENDOCRINE: Denies excessive thirst  Denies intolerance to heat or cold  PSYCHOSOCIAL: Denies depression or anxiety  Denies any recent memory loss  Historical Information   No past medical history on file    Past Surgical History:   Procedure Laterality Date   • BUNIONECTOMY Right 2007   • TONSILLECTOMY Bilateral 2005     Social History   Social History     Substance and Sexual Activity   Alcohol Use Yes    Comment: socially     Social History     Substance and Sexual Activity   Drug Use No     Social History     Tobacco Use   Smoking Status Never Smoker   Smokeless Tobacco Never Used     Family History   Problem Relation Age of Onset   • Depression Mother    • Anxiety disorder Mother    • Bipolar disorder Mother    • Carpal tunnel syndrome Mother    • Colon cancer Mother 62   • Diabetes Father    • Heart attack Father    • No Known Problems Sister    • Cancer Maternal Grandmother    • No Known Problems Maternal Grandfather    • Cancer Paternal Grandmother    • Diabetes Paternal Grandfather    • Cancer Maternal Uncle        Meds/Allergies       Current Outpatient Medications:   •  norgestimate-ethinyl estradiol (ORTHO-CYCLEN) 0 25-35 MG-MCG per tablet  •  pantoprazole (PROTONIX) 40 mg tablet    No Known Allergies        Objective     not currently breastfeeding  There is no height or weight on file to calculate BMI  PHYSICAL EXAM:      General Appearance:   Alert, cooperative, no distress   HEENT:   Normocephalic, atraumatic, anicteric      Neck:  Supple, symmetrical, trachea midline   Lungs:   Clear to auscultation bilaterally; no rales, rhonchi or wheezing; respirations unlabored    Heart[de-identified]   Regular rate and rhythm; no murmur, rub, or gallop  Abdomen:   Soft, non-tender, non-distended; normal bowel sounds; no masses, no organomegaly    Genitalia:   Deferred    Rectal:   Deferred    Extremities:  No cyanosis, clubbing or edema    Pulses:  2+ and symmetric    Skin:  No jaundice, rashes, or lesions    Lymph nodes:  No palpable cervical lymphadenopathy        Lab Results:   No visits with results within 1 Day(s) from this visit     Latest known visit with results is:   Admission on 10/12/2022, Discharged on 10/12/2022   Component Date Value   • WBC 10/12/2022 9 64    • RBC 10/12/2022 4 47    • Hemoglobin 10/12/2022 12 4    • Hematocrit 10/12/2022 38 2    • MCV 10/12/2022 86    • MCH 10/12/2022 27 7    • MCHC 10/12/2022 32 5    • RDW 10/12/2022 12 6    • MPV 10/12/2022 9 6    • Platelets 50/11/4940 295    • nRBC 10/12/2022 0    • Neutrophils Relative 10/12/2022 50    • Immat GRANS % 10/12/2022 0    • Lymphocytes Relative 10/12/2022 38    • Monocytes Relative 10/12/2022 7    • Eosinophils Relative 10/12/2022 5    • Basophils Relative 10/12/2022 0    • Neutrophils Absolute 10/12/2022 4 85    • Immature Grans Absolute 10/12/2022 0 02    • Lymphocytes Absolute 10/12/2022 3 66    • Monocytes Absolute 10/12/2022 0 63    • Eosinophils Absolute 10/12/2022 0 45    • Basophils Absolute 10/12/2022 0 03    • Sodium 10/12/2022 138    • Potassium 10/12/2022 3 8    • Chloride 10/12/2022 108    • CO2 10/12/2022 25    • ANION GAP 10/12/2022 5    • BUN 10/12/2022 9    • Creatinine 10/12/2022 0 90    • Glucose 10/12/2022 91    • Calcium 10/12/2022 9 0    • Corrected Calcium 10/12/2022 9 7    • AST 10/12/2022 14    • ALT 10/12/2022 20    • Alkaline Phosphatase 10/12/2022 74    • Total Protein 10/12/2022 7 5    • Albumin 10/12/2022 3 1 (A)   • Total Bilirubin 10/12/2022 0 18 (A)   • eGFR 10/12/2022 86    • Lipase 10/12/2022 200    • Color, UA 10/12/2022 Colorless    • Clarity, UA 10/12/2022 Clear    • Specific Gravity, UA 10/12/2022 1 007    • pH, UA 10/12/2022 6 5    • Leukocytes, UA 10/12/2022 Negative    • Nitrite, UA 10/12/2022 Negative    • Protein, UA 10/12/2022 Negative    • Glucose, UA 10/12/2022 Negative    • Ketones, UA 10/12/2022 Negative    • Urobilinogen, UA 10/12/2022 <2 0    • Bilirubin, UA 10/12/2022 Negative    • Occult Blood, UA 10/12/2022 Negative    • EXT PREG TEST UR (Ref: N* 10/12/2022 negative    • Control 10/12/2022 valid    • GGT 10/12/2022 11          Radiology Results:   US right upper quadrant    Result Date: 10/12/2022  Narrative: RIGHT UPPER QUADRANT ULTRASOUND INDICATION:     R10 13: Epigastric pain  COMPARISON:  None available  TECHNIQUE:   Real-time ultrasound of the right upper quadrant was performed with a curvilinear transducer with both volumetric sweeps and still imaging techniques   FINDINGS: PANCREAS:  Visualized portions of the pancreas are within normal limits; the distal aspect of the pancreatic tail is obscured by bowel gas  AORTA AND IVC:  Visualized portions are normal for patient age  LIVER: Size:  Within normal range  The liver measures 16 3 cm in the midclavicular line  Contour:  Surface contour is smooth  Parenchyma:  Echogenicity and echotexture are within normal limits  No liver mass identified  Limited imaging of the main portal vein shows it to be patent and hepatopetal   BILIARY: No gallbladder findings  No intrahepatic biliary dilatation  CBD measures 5 0 mm  No evidence of choledocholithiasis  KIDNEY: Right kidney measures 11 6 x 5 3 x 5 8 cm  Volume 189 1 mL Kidney within normal limits  ASCITES:   None  Impression: The distal aspect of the pancreatic tail was unable to be visualized due to overlying bowel gas  The examination is otherwise within normal limits  Workstation performed: [id number]     CT Abdomen pelvis with contrast    Result Date: 10/12/2022  Narrative: CT ABDOMEN AND PELVIS WITH IV CONTRAST INDICATION:   Epigastric pain, nausea  Epigastric tenderness  COMPARISON:  Right upper quadrant ultrasound dated October 11, 2022  TECHNIQUE:  CT examination of the abdomen and pelvis was performed  Axial, sagittal, and coronal 2D reformatted images were created from the source data and submitted for interpretation  Radiation dose length product (DLP) for this visit:  494 38 mGy-cm   This examination, like all CT scans performed in the [facility], was performed utilizing techniques to minimize radiation dose exposure, including the use of iterative  reconstruction and automated exposure control  IV Contrast:  100 mL of iohexol (OMNIPAQUE) Enteric Contrast:  Enteric contrast was not administered  FINDINGS: ABDOMEN LOWER CHEST:  No clinically significant abnormality identified in the visualized lower chest  LIVER/BILIARY TREE:  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, suggesting Riedel's configuration  The liver is otherwise unremarkable   GALLBLADDER:  No calcified gallstones  No pericholecystic inflammatory change  SPLEEN:  Unremarkable  PANCREAS:  Unremarkable  ADRENAL GLANDS:  Unremarkable  KIDNEYS/URETERS:  Unremarkable  No hydronephrosis  STOMACH AND BOWEL:  Unremarkable  APPENDIX:  No findings to suggest appendicitis  ABDOMINOPELVIC CAVITY:  No ascites  No pneumoperitoneum  No lymphadenopathy  VESSELS:  Unremarkable for patient's age  PELVIS REPRODUCTIVE ORGANS:  Unremarkable for patient's age  URINARY BLADDER:  Unremarkable  ABDOMINAL WALL/INGUINAL REGIONS:  Unremarkable  OSSEOUS STRUCTURES:  No acute fracture or destructive osseous lesion  There are bilateral chronic appearing pars defects at L5  Impression: No evidence of acute abnormality   Workstation performed: [id number]

## 2022-11-03 DIAGNOSIS — R10.13 EPIGASTRIC PAIN: ICD-10-CM

## 2022-11-06 RX ORDER — PANTOPRAZOLE SODIUM 40 MG/1
TABLET, DELAYED RELEASE ORAL
Qty: 90 TABLET | Refills: 1 | Status: SHIPPED | OUTPATIENT
Start: 2022-11-06

## 2023-01-10 ENCOUNTER — OFFICE VISIT (OUTPATIENT)
Dept: FAMILY MEDICINE CLINIC | Facility: CLINIC | Age: 28
End: 2023-01-10

## 2023-01-10 VITALS
HEIGHT: 65 IN | SYSTOLIC BLOOD PRESSURE: 100 MMHG | BODY MASS INDEX: 31.32 KG/M2 | WEIGHT: 188 LBS | RESPIRATION RATE: 16 BRPM | TEMPERATURE: 98.2 F | OXYGEN SATURATION: 97 % | HEART RATE: 80 BPM | DIASTOLIC BLOOD PRESSURE: 70 MMHG

## 2023-01-10 DIAGNOSIS — J32.9 SINUSITIS, UNSPECIFIED CHRONICITY, UNSPECIFIED LOCATION: Primary | ICD-10-CM

## 2023-01-10 RX ORDER — AMOXICILLIN AND CLAVULANATE POTASSIUM 875; 125 MG/1; MG/1
1 TABLET, FILM COATED ORAL EVERY 12 HOURS SCHEDULED
Qty: 14 TABLET | Refills: 0 | Status: SHIPPED | OUTPATIENT
Start: 2023-01-10 | End: 2023-01-17

## 2023-01-10 NOTE — PROGRESS NOTES
FAMILY PRACTICE OFFICE VISIT       NAME: Coy Contreras  AGE: 32 y o  SEX: female       : 1995        MRN: [id number]    Assessment and Plan   1  Sinusitis, unspecified chronicity, unspecified location  -     amoxicillin-clavulanate (AUGMENTIN) 875-125 mg per tablet; Take 1 tablet by mouth every 12 (twelve) hours for 7 days     Start Augmentin  Take 2 times per day with food for 7 days  Call for persisting or worsening symptoms  Chief Complaint     Chief Complaint   Patient presents with   • Cold Like Symptoms     Runny nose, sore throat, burning, chest congestion 1 + wk       History of Present Illness     Coy Contreras is a 32year old female presenting today for URI symptoms  Started one week ago  Feeling better over weekend, but  night felt worse again  Home COVID-19 test negative  No fevers  Sore throat, rhinorrhea  Little head pressure  No congestion  Chest tight, congested  No wheezing  No chest pain  No shortness of breath  Worse night time and morning    +cough, productive sometimes  December was sick as well  No known sick contacts  Review of Systems   Review of Systems   Constitutional: Positive for fatigue  Negative for chills, diaphoresis and fever  HENT: Positive for congestion and rhinorrhea  Negative for ear pain and sore throat  Respiratory: Positive for cough and chest tightness  Negative for shortness of breath and wheezing  Cardiovascular: Negative  Musculoskeletal: Negative for myalgias  Neurological: Positive for headaches  I have reviewed the patient's medical history in detail; there are no changes to the history as noted in the electronic medical record      Objective     Vitals:    01/10/23 1203   BP: 100/70   Pulse: 80   Resp: 16   Temp: 98 2 °F (36 8 °C)   TempSrc: Temporal   SpO2: 97%   Weight: 85 3 kg (188 lb)   Height: 5' 4 69" (1 643 m)     Wt Readings from Last 3 Encounters:   01/10/23 85 3 kg (188 lb)   10/19/22 87 9 kg (193 lb 12 8 oz)   10/06/22 89 8 kg (198 lb)     Physical Exam  Vitals and nursing note reviewed  Constitutional:       General: She is not in acute distress  Appearance: Normal appearance  She is not ill-appearing  HENT:      Head: Atraumatic  Right Ear: Tympanic membrane normal       Left Ear: Tympanic membrane normal       Nose: Congestion present  Mouth/Throat:      Mouth: Mucous membranes are moist       Comments: Yellow thick post nasal drip  Eyes:      Conjunctiva/sclera: Conjunctivae normal    Cardiovascular:      Rate and Rhythm: Normal rate and regular rhythm  Heart sounds: No murmur heard  Pulmonary:      Effort: Pulmonary effort is normal  No respiratory distress  Breath sounds: Normal breath sounds  No wheezing or rales  Musculoskeletal:      Cervical back: Normal range of motion and neck supple  Right lower leg: No edema  Left lower leg: No edema  Lymphadenopathy:      Cervical: No cervical adenopathy  Neurological:      Mental Status: She is alert  Psychiatric:         Mood and Affect: Mood normal             ALLERGIES:  No Known Allergies    Current Medications     Current Outpatient Medications   Medication Sig Dispense Refill   • amoxicillin-clavulanate (AUGMENTIN) 875-125 mg per tablet Take 1 tablet by mouth every 12 (twelve) hours for 7 days 14 tablet 0   • norgestimate-ethinyl estradiol (ORTHO-CYCLEN) 0 25-35 MG-MCG per tablet Take 1 tablet by mouth daily     • pantoprazole (PROTONIX) 40 mg tablet TAKE 1 TABLET BY MOUTH DAILY BEFORE BREAKFAST 90 tablet 1     No current facility-administered medications for this visit           Health Maintenance     Health Maintenance   Topic Date Due   • Hepatitis C Screening  Never done   • COVID-19 Vaccine (1) Never done   • HIV Screening  Never done   • Annual Physical  Never done   • Influenza Vaccine (1) 06/30/2023 (Originally 9/1/2022)   • Depression Screening  10/06/2023   • BMI: Followup Plan  10/06/2023   • BMI: Adult  01/10/2024   • Cervical Cancer Screening  11/13/2024   • DTaP,Tdap,and Td Vaccines (6 - Td or Tdap) 01/09/2029   • HIB Vaccine  Completed   • Hepatitis B Vaccine  Completed   • IPV Vaccine  Completed   • Pneumococcal Vaccine: Pediatrics (0 to 5 Years) and At-Risk Patients (6 to 59 Years)  Aged Out   • Hepatitis A Vaccine  Aged Out   • Meningococcal ACWY Vaccine  Aged Out   • HPV Vaccine  Aged [employer] History   Administered Date(s) Administered   • DTP 1995, 1995, 06/10/1994, 04/21/1997, 07/21/1998   • HPV9 11/13/2019   • Hep B, Adolescent or Pediatric 1995, 1995, 06/10/1994, 07/01/1998   • HiB 1995, 1995, 1993, 06/10/1994, 04/21/1997   • IPV 1995, 1993, 1993, 07/21/1998   • MMR 01/01/1997, 04/21/1997, 01/01/1998, 07/21/1998   • Meningococcal Polysaccharide (MPSV4) 01/15/2009   • OPV 1995, 1993, 06/10/1994, 07/21/1998   • Tdap 01/09/2019   • Tetanus, adsorbed 1993, 1993, 06/10/1994, 04/02/1997, 09/01/1998, 01/15/2009   • Varicella 09/16/1998, 06/14/2011       JULIAN Herrera

## 2023-02-02 DIAGNOSIS — J32.9 SINUSITIS, UNSPECIFIED CHRONICITY, UNSPECIFIED LOCATION: Primary | ICD-10-CM

## 2023-02-02 RX ORDER — DOXYCYCLINE HYCLATE 100 MG/1
100 CAPSULE ORAL EVERY 12 HOURS SCHEDULED
Qty: 14 CAPSULE | Refills: 0 | Status: SHIPPED | OUTPATIENT
Start: 2023-02-02 | End: 2023-02-09

## 2023-02-02 RX ORDER — PREDNISONE 10 MG/1
TABLET ORAL
Qty: 20 TABLET | Refills: 0 | Status: SHIPPED | OUTPATIENT
Start: 2023-02-02

## 2023-05-15 DIAGNOSIS — R10.13 EPIGASTRIC PAIN: ICD-10-CM

## 2023-05-15 RX ORDER — PANTOPRAZOLE SODIUM 40 MG/1
TABLET, DELAYED RELEASE ORAL
Qty: 90 TABLET | Refills: 1 | Status: SHIPPED | OUTPATIENT
Start: 2023-05-15

## 2023-11-08 ENCOUNTER — TELEPHONE (OUTPATIENT)
Dept: FAMILY MEDICINE CLINIC | Facility: CLINIC | Age: 28
End: 2023-11-08

## 2023-11-08 NOTE — TELEPHONE ENCOUNTER
Please offer patient an appointment. Thank you.      ----- Message from Porsche Ballard MA sent at 11/8/2023  8:08 AM EST -----  Regarding: FW: Neck  Contact: [phone]  Good Morning Cristel  Please advise. Thanks   ----- Message -----  From: Lyla Blair  Sent: 11/8/2023   7:57 AM EST  To: [facility]  Subject: Neck                                             Hello! I’m hoping you can guide me a little here. I think I slept terrible my wrong. My neck and trap are so so sore. Like very painful if I move the wrong way and I feel throbbing when it hurts so bad. Moving my head hurts if I move it to the sides too much. Online says muscle relaxer. So I’m asking for professional advice. Maybe I could do a online visit with you.  Thanks

## 2024-05-08 ENCOUNTER — OFFICE VISIT (OUTPATIENT)
Dept: FAMILY MEDICINE CLINIC | Facility: CLINIC | Age: 29
End: 2024-05-08
Payer: COMMERCIAL

## 2024-05-08 VITALS
BODY MASS INDEX: 32.64 KG/M2 | RESPIRATION RATE: 18 BRPM | OXYGEN SATURATION: 97 % | SYSTOLIC BLOOD PRESSURE: 122 MMHG | TEMPERATURE: 98 F | WEIGHT: 191.2 LBS | HEIGHT: 64 IN | HEART RATE: 72 BPM | DIASTOLIC BLOOD PRESSURE: 84 MMHG

## 2024-05-08 DIAGNOSIS — Z00.00 PHYSICAL EXAM, ANNUAL: Primary | ICD-10-CM

## 2024-05-08 PROCEDURE — 99395 PREV VISIT EST AGE 18-39: CPT | Performed by: NURSE PRACTITIONER

## 2024-05-08 RX ORDER — LEVONORGESTREL/ETHIN.ESTRADIOL 0.1-0.02MG
1 TABLET ORAL DAILY
COMMUNITY
Start: 2024-04-23

## 2024-05-08 NOTE — PROGRESS NOTES
FAMILY PRACTICE HEALTH MAINTENANCE OFFICE VISIT  [facility] PRACTICE    NAME: Valarie Mercado  AGE: 28 y.o. SEX: female  : 1995     DATE: 2024    Assessment and Plan     1. Physical exam, annual        Patient Counseling:   Nutrition: Stressed importance of a well balanced diet, moderation of sodium/saturated fat, caloric balance and sufficient intake of fiber  Exercise: Stressed the importance of regular exercise with a goal of 150 minutes per week  Dental Health: Discussed daily flossing and brushing and regular dental visits     Immunizations reviewed:  Will check on if HPV vaccines are covered to finish the course. All other vaccines up to date.  Discussed benefits of:  Colon Cancer Screening, Cervical Cancer screening, and Screening labs. Colon cancer screening at age 43, mom diagnosed with colon cancer at age 56-57, late stages. Lipid panel in  completed. Pap is up to date.   BMI Counseling: Body mass index is 32.82 kg/m². Discussed with patient's BMI with her. The BMI is above normal. Nutrition recommendations include 3-5 servings of fruits/vegetables daily and consuming healthier snacks. Exercise recommendations include exercising 3-5 times per week and strength training exercises.    Follow up in one year or sooner if needed.         Chief Complaint     Chief Complaint   Patient presents with    Physical Exam       History of Present Illness     Valarie Mercado is a 28 year old female presenting today for annual exam.     Mom colon cancer diagnosed around age 56-57.    Had one HPV vaccine in 2019--check with insurance regarding if this would incur any out of pocket cost if she wants to finish the course.     She feels well and has no complaints today.           Well Adult Physical   Patient here for a comprehensive physical exam.      Diet and Physical Activity  Diet: well balanced diet  Exercise: frequently likes to be active.   Walks for exercise,  and walks dogs, lifts some weights.      Depression Screen  PHQ-2/9 Depression Screening    Little interest or pleasure in doing things: 0 - not at all  Feeling down, depressed, or hopeless: 0 - not at all  PHQ-2 Score: 0  PHQ-2 Interpretation: Negative depression screen          General Health  Hearing: Normal:  bilateral  Vision: no vision problems, wears glasses, has regular eye exams.   Dental: regular dental visits Due for check up. Will schedule.     Reproductive Health  No issues  and Follows with gynecologist      The following portions of the patient's history were reviewed and updated as appropriate: allergies, current medications, past family history, past medical history, past social history, past surgical history and problem list.    Review of Systems     Review of Systems   Constitutional: Negative.    HENT: Negative.     Respiratory: Negative.     Cardiovascular: Negative.    Gastrointestinal: Negative.    Genitourinary: Negative.    Musculoskeletal: Negative.    Skin: Negative.    Neurological: Negative.    Hematological: Negative.    Psychiatric/Behavioral: Negative.         Past Medical History     History reviewed. No pertinent past medical history.    Past Surgical History     Past Surgical History:   Procedure Laterality Date    BUNIONECTOMY Right 2007    TONSILLECTOMY Bilateral 2005       Social History     Social History     Socioeconomic History    Marital status: /Civil Union     Spouse name: None    Number of children: None    Years of education: None    Highest education level: None   Occupational History    Occupation:    Tobacco Use    Smoking status: Never    Smokeless tobacco: Never   Vaping Use    Vaping status: Never Used   Substance and Sexual Activity    Alcohol use: Yes     Comment: socially    Drug use: No    Sexual activity: Yes     Partners: Male     Birth control/protection: OCP   Other Topics Concern    None   Social History Narrative    None     Social  "Determinants of Health     Financial Resource Strain: Not on file   Food Insecurity: Not on file   Transportation Needs: Not on file   Physical Activity: Not on file   Stress: Not on file   Social Connections: Not on file   Intimate Partner Violence: Not on file   Housing Stability: Not on file       Family History     Family History   Problem Relation Age of Onset    Colon cancer Mother         56-57 diagnosed    Depression Mother     Anxiety disorder Mother     Bipolar disorder Mother     Carpal tunnel syndrome Mother     Diabetes Father     Heart attack Father     No Known Problems Sister     Cancer Maternal Grandmother     No Known Problems Maternal Grandfather     Cancer Paternal Grandmother     Diabetes Paternal Grandfather     Cancer Maternal Uncle        Current Medications       Current Outpatient Medications:     ibuprofen (MOTRIN) 800 mg tablet, Take 800 mg by mouth every 8 (eight) hours as needed, Disp: , Rfl:     levonorgestrel-ethinyl estradiol (AVIANE,ALESSE,LESSINA) 0.1-20 MG-MCG per tablet, Take 1 tablet by mouth daily, Disp: , Rfl:     scopolamine (TRANSDERM-SCOP) 1 mg/3 days TD 72 hr patch, Place 1 patch on the skin over 72 hours every third day, Disp: 2 patch, Rfl: 0     Allergies     No Known Allergies    Objective     /84 (BP Location: Left arm, Patient Position: Sitting, Cuff Size: Standard)   Pulse 72   Temp 98 °F (36.7 °C) (Temporal)   Resp 18   Ht 5' 4\" (1.626 m)   Wt 86.7 kg (191 lb 3.2 oz)   LMP  (LMP Unknown)   SpO2 97%   BMI 32.82 kg/m²      Physical Exam  Vitals and nursing note reviewed.   Constitutional:       General: She is not in acute distress.     Appearance: Normal appearance. She is well-developed. She is not ill-appearing.   HENT:      Head: Normocephalic and atraumatic.      Right Ear: Tympanic membrane normal.      Left Ear: Tympanic membrane normal.      Mouth/Throat:      Mouth: Mucous membranes are moist.      Pharynx: Oropharynx is clear.   Eyes:      " Conjunctiva/sclera: Conjunctivae normal.      Pupils: Pupils are equal, round, and reactive to light.   Neck:      Thyroid: No thyromegaly.   Cardiovascular:      Rate and Rhythm: Normal rate and regular rhythm.      Heart sounds: No murmur heard.  Pulmonary:      Effort: Pulmonary effort is normal.      Breath sounds: Normal breath sounds.   Musculoskeletal:         General: No deformity.      Cervical back: Normal range of motion and neck supple.      Right lower leg: No edema.      Left lower leg: No edema.   Lymphadenopathy:      Cervical: No cervical adenopathy.   Skin:     Findings: No rash.   Neurological:      Mental Status: She is alert and oriented to person, place, and time.   Psychiatric:         Mood and Affect: Mood normal.           JULIAN Yoder  [facility]

## 2024-07-24 ENCOUNTER — OFFICE VISIT (OUTPATIENT)
Dept: FAMILY MEDICINE CLINIC | Facility: CLINIC | Age: 29
End: 2024-07-24
Payer: COMMERCIAL

## 2024-07-24 VITALS
OXYGEN SATURATION: 97 % | WEIGHT: 198.4 LBS | BODY MASS INDEX: 33.87 KG/M2 | HEART RATE: 67 BPM | RESPIRATION RATE: 16 BRPM | TEMPERATURE: 97.6 F | DIASTOLIC BLOOD PRESSURE: 70 MMHG | HEIGHT: 64 IN | SYSTOLIC BLOOD PRESSURE: 114 MMHG

## 2024-07-24 DIAGNOSIS — M25.472 ANKLE EDEMA, BILATERAL: Primary | ICD-10-CM

## 2024-07-24 DIAGNOSIS — M25.471 ANKLE EDEMA, BILATERAL: Primary | ICD-10-CM

## 2024-07-24 PROCEDURE — 99213 OFFICE O/P EST LOW 20 MIN: CPT | Performed by: FAMILY MEDICINE

## 2024-07-24 NOTE — PROGRESS NOTES
"Ambulatory Visit  Name: Valarie Mercado      : 1995      MRN: [id number]  Encounter Provider: Khushbu Crabtree DO  Encounter Date: 2024   Encounter department: [facility]    Assessment & Plan   1. Ankle edema, bilateral  Assessment & Plan:  After 6 hour car ride, improved over a couple of days with elevation of legs, now completely resolved. Likely due to being sedentary for prolonged period. Recommend next long trip to use compression socks, take frequent breaks, and pump calves during driving if she is a passenger. Follow up if any new concerns.       History of Present Illness     HPI  Here for bilateral foot swelling. Drove long period about six hours 2 days ago coming home from beach vacation. Diet on vacation was tacos, fish, crab, very minimal alcohol. When she got out of the car her feet fell off, she looked at her legs and saw both ankle were swollen. Toes and legs were ok, no swelling. No redness or rash. Slept with feet elevated the following night and swelling was somewhat improved. Wore high socks, not compression, did not help. This morning when she woke up swelling is completely resolved after elevating legs last night. History of bunion surgeries bilaterally but no MSK surgeries otherwise or injuries to either leg.     Review of Systems    Objective     /70 (BP Location: Left arm, Patient Position: Sitting, Cuff Size: Large)   Pulse 67   Temp 97.6 °F (36.4 °C) (Temporal)   Resp 16   Ht 5' 4\" (1.626 m)   Wt 90 kg (198 lb 6.4 oz)   SpO2 97%   BMI 34.06 kg/m²     Physical Exam  Vitals reviewed.   Constitutional:       Appearance: Normal appearance.   HENT:      Head: Normocephalic.   Cardiovascular:      Rate and Rhythm: Normal rate.   Pulmonary:      Effort: Pulmonary effort is normal.   Abdominal:      General: Abdomen is flat.   Musculoskeletal:         General: No swelling. Normal range of motion.      Right lower leg: No edema.      Left lower leg: No " edema.   Skin:     General: Skin is warm and dry.   Neurological:      Mental Status: She is alert.   Psychiatric:         Mood and Affect: Mood normal.         Behavior: Behavior normal.       Administrative Statements

## 2024-07-24 NOTE — ASSESSMENT & PLAN NOTE
After 6 hour car ride, improved over a couple of days with elevation of legs, now completely resolved. Likely due to being sedentary for prolonged period. Recommend next long trip to use compression socks, take frequent breaks, and pump calves during driving if she is a passenger. Follow up if any new concerns.

## 2024-08-16 ENCOUNTER — OFFICE VISIT (OUTPATIENT)
Dept: FAMILY MEDICINE CLINIC | Facility: CLINIC | Age: 29
End: 2024-08-16
Payer: COMMERCIAL

## 2024-08-16 VITALS
TEMPERATURE: 98 F | RESPIRATION RATE: 16 BRPM | WEIGHT: 198 LBS | SYSTOLIC BLOOD PRESSURE: 120 MMHG | HEIGHT: 64 IN | DIASTOLIC BLOOD PRESSURE: 80 MMHG | BODY MASS INDEX: 33.8 KG/M2 | HEART RATE: 73 BPM | OXYGEN SATURATION: 98 %

## 2024-08-16 DIAGNOSIS — R19.5 LOOSE STOOLS: Primary | ICD-10-CM

## 2024-08-16 PROCEDURE — 99213 OFFICE O/P EST LOW 20 MIN: CPT | Performed by: NURSE PRACTITIONER

## 2024-08-16 NOTE — PROGRESS NOTES
FAMILY PRACTICE OFFICE VISIT       NAME: Valarie Mercado  AGE: 29 y.o. SEX: female       : 1995        MRN: [id number]    Assessment and Plan   1. Loose stools     Loose stools with mild rectal discomfort.   Rectal discomfort has now resolved.   Will trial metamucil 2 tablespoons daily to bulk up stools.   If symptoms do not resolve completely in 1 month, she will contact me and we will refer to gastroenterology.  Mom had colon cancer at age 56-57, low threshold for GI referral.   We discussed in detail signs and symptoms to look out for in regards to colon cancer.      Chief Complaint     Chief Complaint   Patient presents with    Leg Pain     Patient is here for upper rt thigh pain    Change in Bowel Habits     Increased bowel movements       History of Present Illness     Valarie Mercado is a 29 year old female presenting today for bowel problem.     Frequent stools. Over the last one year.   Han tea 4 cups per day, cut back to one now. Symptoms didn't change.     Painful, between thigh and buttocks. Discomfort.   Better now.     ? Hemorrhoids.   Mom passed colon cancer 56-57 years old.     Has not tried to eliminate caffeine.     So far today, went twice, loose stools.     Yesterday, 4 times.     Never uses imodium or OTC products.     No abdominal pain.     No bleeding.     Hemorrohoids possible.     Heating pad last night.     Normally has 2 stools per day one in the morning and one in the evening.   Recently 2-4 stools per day.                                     Review of Systems   Review of Systems   Gastrointestinal:  Negative for abdominal distention and abdominal pain.   Negative for anal bleeding, bloody stools, constipation, nausea, vomiting.Positive for rectal discomfort.     I have reviewed the patient's medical history in detail; there are no changes to the history as noted in the electronic medical record.    Objective     Vitals:    24 0947   BP: 120/80   Pulse: 73   Resp: 16  "  Temp: 98 °F (36.7 °C)   TempSrc: Temporal   SpO2: 98%   Weight: 89.8 kg (198 lb)   Height: 5' 4\" (1.626 m)     Wt Readings from Last 3 Encounters:   08/16/24 89.8 kg (198 lb)   07/24/24 90 kg (198 lb 6.4 oz)   05/08/24 86.7 kg (191 lb 3.2 oz)     Physical Exam  Vitals and nursing note reviewed. Exam conducted with a chaperone present (Leila COLEMAN).   Constitutional:       General: She is not in acute distress.     Appearance: Normal appearance. She is not ill-appearing.   Cardiovascular:      Rate and Rhythm: Normal rate and regular rhythm.      Heart sounds: No murmur heard.  Pulmonary:      Effort: Pulmonary effort is normal. No respiratory distress.      Breath sounds: Normal breath sounds. No wheezing or rales.   Abdominal:      General: Abdomen is flat. Bowel sounds are normal. There is no distension.      Palpations: Abdomen is soft. There is no mass.      Tenderness: There is no abdominal tenderness. There is no guarding.   Genitourinary:     Rectum: Normal.   Neurological:      Mental Status: She is alert.   Psychiatric:         Mood and Affect: Mood normal.            ALLERGIES:  No Known Allergies    Current Medications     Current Outpatient Medications   Medication Sig Dispense Refill    ibuprofen (MOTRIN) 800 mg tablet Take 800 mg by mouth every 8 (eight) hours as needed      levonorgestrel-ethinyl estradiol (AVIANE,ALESSE,LESSINA) 0.1-20 MG-MCG per tablet Take 1 tablet by mouth daily      scopolamine (TRANSDERM-SCOP) 1 mg/3 days TD 72 hr patch Place 1 patch on the skin over 72 hours every third day 2 patch 0     No current facility-administered medications for this visit.         Health Maintenance     Health Maintenance   Topic Date Due    Hepatitis C Screening  Never done    HIV Screening  Never done    HPV Vaccine (2 - 3-dose series) 12/11/2019    COVID-19 Vaccine (1 - 2023-24 season) Never done    Influenza Vaccine (1) 09/01/2024    Cervical Cancer Screening  11/13/2024    Depression Screening  " 05/08/2025    Annual Physical  05/08/2025    DTaP,Tdap,and Td Vaccines (6 - Td or Tdap) 01/09/2029    Zoster Vaccine (1 of 2) 05/24/2043    RSV Vaccine Age 60+ Years (1 - 1-dose 60+ series) 05/24/2053    HIB Vaccine  Completed    IPV Vaccine  Completed    RSV Vaccine age 0-20 Months  Aged Out    Pneumococcal Vaccine: Pediatrics (0 to 5 Years) and At-Risk Patients (6 to 64 Years)  Aged Out    Hepatitis A Vaccine  Aged Out    Meningococcal ACWY Vaccine  Aged Out     Immunization History   Administered Date(s) Administered    DTP 1995, 1993, 06/10/1994, 04/21/1997, 07/21/1998    HPV9 11/13/2019    Hep B, Adolescent or Pediatric 1993, 1993, 06/10/1994, 07/01/1998    HiB 1993, 1993, 1993, 06/10/1994, 04/21/1997    IPV 1993, 1993, 1993, 07/21/1998    MMR 01/01/1997, 04/21/1997, 01/01/1998, 07/21/1998    Meningococcal Polysaccharide (MPSV4) 01/15/2009    OPV 1993, 1993, 06/10/1994, 07/21/1998    Tdap 01/09/2019    Tetanus, adsorbed 1993, 1993, 06/10/1994, 04/02/1997, 09/01/1998, 01/15/2009    Varicella 09/16/1998, 06/14/2011       JULIAN Yoder